# Patient Record
Sex: FEMALE | Race: WHITE
[De-identification: names, ages, dates, MRNs, and addresses within clinical notes are randomized per-mention and may not be internally consistent; named-entity substitution may affect disease eponyms.]

---

## 2020-11-06 LAB
ANION GAP SERPL CALC-SCNC: 6 MMOL/L (ref 5–19)
APPEARANCE UR: CLEAR
APTT PPP: YELLOW S
BARBITURATES UR QL SCN: NEGATIVE
BILIRUB UR QL STRIP: NEGATIVE
BUN SERPL-MCNC: 19 MG/DL (ref 7–20)
CALCIUM: 9.4 MG/DL (ref 8.4–10.2)
CHLORIDE SERPL-SCNC: 104 MMOL/L (ref 98–107)
CO2 SERPL-SCNC: 27 MMOL/L (ref 22–30)
ERYTHROCYTE [DISTWIDTH] IN BLOOD BY AUTOMATED COUNT: 14.1 % (ref 11.5–14)
ETHANOL SERPL-MCNC: < 10 MG/DL
GLUCOSE SERPL-MCNC: 87 MG/DL (ref 75–110)
GLUCOSE UR STRIP-MCNC: NEGATIVE MG/DL
HCT VFR BLD CALC: 37.6 % (ref 36–47)
HGB BLD-MCNC: 12.9 G/DL (ref 12–15.5)
KETONES UR STRIP-MCNC: NEGATIVE MG/DL
MCH RBC QN AUTO: 30.9 PG (ref 27–33.4)
MCHC RBC AUTO-ENTMCNC: 34.3 G/DL (ref 32–36)
MCV RBC AUTO: 90 FL (ref 80–97)
METHADONE UR QL SCN: NEGATIVE
NITRITE UR QL STRIP: NEGATIVE
PCP UR QL SCN: NEGATIVE
PH UR STRIP: 5 [PH] (ref 5–9)
PLATELET # BLD: 236 10^3/UL (ref 150–450)
POTASSIUM SERPL-SCNC: 4.5 MMOL/L (ref 3.6–5)
PROT UR STRIP-MCNC: NEGATIVE MG/DL
RBC # BLD AUTO: 4.17 10^6/UL (ref 3.72–5.28)
SP GR UR STRIP: 1.01
URINE AMPHETAMINES SCREEN: NEGATIVE
URINE BENZODIAZEPINES SCREEN: NEGATIVE
URINE COCAINE SCREEN: NEGATIVE
URINE MARIJUANA (THC) SCREEN: NEGATIVE
UROBILINOGEN UR-MCNC: NEGATIVE MG/DL (ref ?–2)
WBC # BLD AUTO: 4.5 10^3/UL (ref 4–10.5)

## 2020-11-06 NOTE — RADIOLOGY REPORT (SQ)
EXAM DESCRIPTION:  CHEST PA/LATERAL



IMAGES COMPLETED DATE/TIME:  11/6/2020 11:06 am



REASON FOR STUDY:  PRE-OP



COMPARISON:  None.



EXAM PARAMETERS:  NUMBER OF VIEWS: two views

TECHNIQUE: Digital Frontal and Lateral radiographic views of the chest acquired.

RADIATION DOSE: NA

LIMITATIONS: none



FINDINGS:  LUNGS AND PLEURA: No opacities, masses or pneumothorax. No pleural effusion.

MEDIASTINUM AND HILAR STRUCTURES: No masses or contour abnormalities.

HEART AND VASCULAR STRUCTURES: Heart normal size.  No evidence for failure.

BONES: No acute findings.

HARDWARE: None in the chest.

OTHER: No other significant finding.



IMPRESSION:  NO SIGNIFICANT RADIOGRAPHIC FINDING IN THE CHEST.



TECHNICAL DOCUMENTATION:  JOB ID:  [id number]

 2011 [employer]- All Rights Reserved



Reading location - IP/workstation name: [email or web address]

## 2020-11-06 NOTE — EKG REPORT
SEVERITY:- ABNORMAL ECG -

SINUS BRADYCARDIA

RBBB

:

Confirmed by: Evens Hill MD 06-Nov-2020 14:54:37

## 2020-11-11 ENCOUNTER — HOSPITAL ENCOUNTER (INPATIENT)
Dept: HOSPITAL 62 - INOR | Age: 65
LOS: 2 days | Discharge: HOME | DRG: 460 | End: 2020-11-13
Attending: ORTHOPAEDIC SURGERY | Admitting: ORTHOPAEDIC SURGERY
Payer: MEDICARE

## 2020-11-11 DIAGNOSIS — F41.1: ICD-10-CM

## 2020-11-11 DIAGNOSIS — M43.16: Primary | ICD-10-CM

## 2020-11-11 DIAGNOSIS — E03.9: ICD-10-CM

## 2020-11-11 DIAGNOSIS — Z86.010: ICD-10-CM

## 2020-11-11 DIAGNOSIS — Z23: ICD-10-CM

## 2020-11-11 DIAGNOSIS — E78.00: ICD-10-CM

## 2020-11-11 DIAGNOSIS — Z79.899: ICD-10-CM

## 2020-11-11 DIAGNOSIS — Z20.828: ICD-10-CM

## 2020-11-11 DIAGNOSIS — M51.16: ICD-10-CM

## 2020-11-11 DIAGNOSIS — Z86.14: ICD-10-CM

## 2020-11-11 PROCEDURE — 94799 UNLISTED PULMONARY SVC/PX: CPT

## 2020-11-11 PROCEDURE — 93010 ELECTROCARDIOGRAM REPORT: CPT

## 2020-11-11 PROCEDURE — 71046 X-RAY EXAM CHEST 2 VIEWS: CPT

## 2020-11-11 PROCEDURE — 0SB20ZZ EXCISION OF LUMBAR VERTEBRAL DISC, OPEN APPROACH: ICD-10-PCS | Performed by: ORTHOPAEDIC SURGERY

## 2020-11-11 PROCEDURE — 90686 IIV4 VACC NO PRSV 0.5 ML IM: CPT

## 2020-11-11 PROCEDURE — C9290 INJ, BUPIVACAINE LIPOSOME: HCPCS

## 2020-11-11 PROCEDURE — 86901 BLOOD TYPING SEROLOGIC RH(D): CPT

## 2020-11-11 PROCEDURE — 86850 RBC ANTIBODY SCREEN: CPT

## 2020-11-11 PROCEDURE — C9803 HOPD COVID-19 SPEC COLLECT: HCPCS

## 2020-11-11 PROCEDURE — C1781 MESH (IMPLANTABLE): HCPCS

## 2020-11-11 PROCEDURE — 80048 BASIC METABOLIC PNL TOTAL CA: CPT

## 2020-11-11 PROCEDURE — 86900 BLOOD TYPING SEROLOGIC ABO: CPT

## 2020-11-11 PROCEDURE — C1713 ANCHOR/SCREW BN/BN,TIS/BN: HCPCS

## 2020-11-11 PROCEDURE — 07DR0ZZ EXTRACTION OF ILIAC BONE MARROW, OPEN APPROACH: ICD-10-PCS | Performed by: ORTHOPAEDIC SURGERY

## 2020-11-11 PROCEDURE — 93005 ELECTROCARDIOGRAM TRACING: CPT

## 2020-11-11 PROCEDURE — 82962 GLUCOSE BLOOD TEST: CPT

## 2020-11-11 PROCEDURE — 87635 SARS-COV-2 COVID-19 AMP PRB: CPT

## 2020-11-11 PROCEDURE — 0SG007J FUSION OF LUMBAR VERTEBRAL JOINT WITH AUTOLOGOUS TISSUE SUBSTITUTE, POSTERIOR APPROACH, ANTERIOR COLUMN, OPEN APPROACH: ICD-10-PCS | Performed by: ORTHOPAEDIC SURGERY

## 2020-11-11 PROCEDURE — 00630 ANES PX LUMBAR REGION NOS: CPT

## 2020-11-11 PROCEDURE — 80307 DRUG TEST PRSMV CHEM ANLYZR: CPT

## 2020-11-11 PROCEDURE — 8E0W4CZ ROBOTIC ASSISTED PROCEDURE OF TRUNK REGION, PERCUTANEOUS ENDOSCOPIC APPROACH: ICD-10-PCS | Performed by: ORTHOPAEDIC SURGERY

## 2020-11-11 PROCEDURE — 87070 CULTURE OTHR SPECIMN AEROBIC: CPT

## 2020-11-11 PROCEDURE — 81001 URINALYSIS AUTO W/SCOPE: CPT

## 2020-11-11 PROCEDURE — 72100 X-RAY EXAM L-S SPINE 2/3 VWS: CPT

## 2020-11-11 PROCEDURE — 90471 IMMUNIZATION ADMIN: CPT

## 2020-11-11 PROCEDURE — 94760 N-INVAS EAR/PLS OXIMETRY 1: CPT

## 2020-11-11 PROCEDURE — 36415 COLL VENOUS BLD VENIPUNCTURE: CPT

## 2020-11-11 PROCEDURE — 83036 HEMOGLOBIN GLYCOSYLATED A1C: CPT

## 2020-11-11 PROCEDURE — G0008 ADMIN INFLUENZA VIRUS VAC: HCPCS

## 2020-11-11 PROCEDURE — 85027 COMPLETE CBC AUTOMATED: CPT

## 2020-11-11 RX ADMIN — DEXAMETHASONE SODIUM PHOSPHATE PRN MG: 10 INJECTION INTRAMUSCULAR; INTRAVENOUS at 20:12

## 2020-11-11 RX ADMIN — OXYCODONE HYDROCHLORIDE PRN MG: 5 TABLET ORAL at 16:17

## 2020-11-11 RX ADMIN — FENTANYL CITRATE PRN MCG: 50 INJECTION INTRAMUSCULAR; INTRAVENOUS at 10:10

## 2020-11-11 RX ADMIN — METHOCARBAMOL SCH MG: 750 TABLET ORAL at 21:58

## 2020-11-11 RX ADMIN — OXYCODONE HYDROCHLORIDE PRN MG: 5 TABLET ORAL at 11:35

## 2020-11-11 RX ADMIN — METFORMIN HYDROCHLORIDE SCH MG: 500 TABLET, FILM COATED ORAL at 17:28

## 2020-11-11 RX ADMIN — CLINDAMYCIN PHOSPHATE SCH MLS/HR: 18 INJECTION, SOLUTION INTRAVENOUS at 21:58

## 2020-11-11 RX ADMIN — GABAPENTIN SCH MG: 300 CAPSULE ORAL at 11:35

## 2020-11-11 RX ADMIN — CLINDAMYCIN PHOSPHATE SCH MLS/HR: 18 INJECTION, SOLUTION INTRAVENOUS at 13:40

## 2020-11-11 RX ADMIN — OXYCODONE HYDROCHLORIDE PRN MG: 5 TABLET ORAL at 20:54

## 2020-11-11 RX ADMIN — GABAPENTIN SCH MG: 300 CAPSULE ORAL at 17:28

## 2020-11-11 RX ADMIN — ATORVASTATIN CALCIUM SCH MG: 40 TABLET, FILM COATED ORAL at 21:58

## 2020-11-11 RX ADMIN — FENTANYL CITRATE PRN MCG: 50 INJECTION INTRAMUSCULAR; INTRAVENOUS at 10:05

## 2020-11-11 RX ADMIN — SENNOSIDES, DOCUSATE SODIUM SCH EACH: 50; 8.6 TABLET, FILM COATED ORAL at 17:28

## 2020-11-11 RX ADMIN — METHOCARBAMOL SCH MG: 750 TABLET ORAL at 13:39

## 2020-11-11 RX ADMIN — Medication SCH: at 13:58

## 2020-11-11 RX ADMIN — DEXAMETHASONE SODIUM PHOSPHATE PRN MG: 10 INJECTION INTRAMUSCULAR; INTRAVENOUS at 13:39

## 2020-11-11 RX ADMIN — GABAPENTIN SCH MG: 300 CAPSULE ORAL at 23:56

## 2020-11-11 RX ADMIN — Medication SCH ML: at 21:58

## 2020-11-11 RX ADMIN — ACETAMINOPHEN SCH MG: 325 TABLET ORAL at 13:38

## 2020-11-11 RX ADMIN — ACETAMINOPHEN SCH MG: 325 TABLET ORAL at 21:57

## 2020-11-11 NOTE — RADIOLOGY REPORT (SQ)
EXAM DESCRIPTION:  L SPINE 2 VIEWS; NO CHG FLUORO



IMAGES COMPLETED DATE/TIME:  11/11/2020 9:55 am



REASON FOR STUDY:  LUMBAR SPINE FUSION ASSISTED WITH FLUORO IN OR M54.5  LOW BACK PAIN M51.16  INTERV
ERTEBRAL DISC DISORDERS W RADICULOPATHY, LUMBAR M43.16  SPONDYLOLISTHESIS, LUMBAR REGION



COMPARISON:  None.



FLUOROSCOPY TIME:  0.9 minutes.

2 images saved to PACS.



TECHNIQUE:  Intra-operative images acquired during surgical procedure to evaluate progress.

NUMBER OF IMAGES: 2 images.



LIMITATIONS:  None.



FINDINGS:  Images of the lower lumbar spine acquired during the procedure.



IMPRESSION:  IMAGE(S) OBTAINED DURING PROCEDURE.



COMMENT:  Quality :  Final reports for procedures using fluoroscopy that document radiation exp
osure indices, or exposure time and number of fluorographic images (if radiation exposure indices are
 not available)

Please consult full operative report of the attending physician for description of the procedure.



TECHNICAL DOCUMENTATION:  JOB ID:  [id number]

 2011 [employer]- All Rights Reserved



Reading location - IP/workstation name: [email or web address]

## 2020-11-11 NOTE — OPERATIVE REPORT
Operative Report


DATE OF SURGERY: 11/11/20


PREOPERATIVE DIAGNOSIS: L4-5 spondylolisthesis.  L4-5 stenosis.  L4-5 degenerat

zahra disc disease.  Back pain.  Lumbar radiculitis


POSTOPERATIVE DIAGNOSIS: L4-5 spondylolisthesis.  L4-5 stenosis.  L4-5 

degenerative disc disease.  Back pain.  Lumbar radiculitis.  Status post L4-5 

anterior lateral lumbar interbody fusion with interbody spacer.  L4-5 posterior 

fusion robotically assisted with instrumentation and left iliac crest aspiration

through separate incision for bone marrow concentration to be used with 

allograft


OPERATION: L4-5 anterior lateral lumbar interbody fusion with interbody spacer. 

L4-5 posterior fusion robotically assisted with instrumentation and left iliac 

crest aspiration through separate incision for bone marrow concentration to be 

used with allograft


SURGEON: TAWNY TRUJILLO


1ST ASSISTANT: NESSA HAYDEN


ANESTHESIA: GA


COMPLICATIONS: 





None


ESTIMATED BLOOD LOSS: 100 cc


PROCEDURE: 





Patient is brought into the room placed under general anesthesia received 900 mg

of clindamycin within 1 hour of cut time was placed on the Jareth table medial 

epicondyles and axillary areas are well-padded.  Neuro monitoring leads for SSEP

and cranial motor testing are placed on the patient as well as a Hutchison catheter 

is placed preoperatively.  After appropriate surgical timeout the [employer] robot is utilized and on the right posterior superior iliac spine a 

pin is placed and attached to the robot.  After obtaining registration imaging 

in the AP and oblique position and verification 6.5 x 45 mm x2 and 6.5 x 40 mm x

2 screws are inserted using portal incisions on the right and the left at L4 and

L5 bilaterally.  Left iliac crest is aspirated at 2 different sites total of 50 

cc of bone marrow aspirate are obtained and concentrated to be used an interbody

spacer with the allograft.





Attention was then paid to the left sided anterior lateral portal for entry into

the disc space which is carried out under navigation guidance upon verification 

also with x-rays and then stimulation thresholds greater than 17 mA.  Upon 

inserting the portal into the disc space at L4-5 fluoroscopy was used to verify 

the position as it did also to verify the screws position.  Endplate geovanna and

curettes and brushes are used to carry out a complete discectomy then the verify

balloon is inserted to verify good contact with the endplates.  Then the 

appropriate size mesh spacer is introduced into the interbody.  The mesh spacer 

is soaked in bone marrow aspirate concentrate and is filled with bone graft 

showing good correction and good containment within the disc space at L4-5.  

Upon neuro monitoring testing and cranial motor testing found to be stable then 

the portal is removed anterior laterally and attention is then paid to the 

placement of the rods upon using the caliper device the appropriate length rods 

were determined to be 40 mm rods on the right and on the left those are passed 

down and locked into position and final tightened.  Then the tabs are removed 

and the portals are irrigated with copious amounts of irrigation and the 

posterior superior iliac spine pin connected to the robot is removed as well and

the deep and superficial tissues were infiltrated with 1.3% Exparel 20 cc mixed 

with 20 cc of 0.5% bupivacaine plain.  The portals are closed using 2-0 Vicryl 

and then Dermabond and Steri-Strips then 4 x 4's were used to cover the wounds 

on the right and the left and dressed with coverall tape.  Please note that this

procedure could not be done without the assistance of Akhil Bloom working to 

assist with the placement of the screws positioning of the robot carrying out 

the aspiration through the left side iliac crest aspiration please also note 

that the iliac crest aspiration is carried out on the left side through a 

separate incision.  Akhil Bloom was instrumental throughout this whole process 

and I could not have done this procedure without his assistance as mentioned 

above.

## 2020-11-11 NOTE — RADIOLOGY REPORT (SQ)
EXAM DESCRIPTION:  L SPINE 2 VIEWS; NO CHG FLUORO



IMAGES COMPLETED DATE/TIME:  11/11/2020 9:55 am



REASON FOR STUDY:  LUMBAR SPINE FUSION ASSISTED WITH FLUORO IN OR M54.5  LOW BACK PAIN M51.16  INTERV
ERTEBRAL DISC DISORDERS W RADICULOPATHY, LUMBAR M43.16  SPONDYLOLISTHESIS, LUMBAR REGION



COMPARISON:  None.



FLUOROSCOPY TIME:  0.9 minutes.

2 images saved to PACS.



TECHNIQUE:  Intra-operative images acquired during surgical procedure to evaluate progress.

NUMBER OF IMAGES: 2 images.



LIMITATIONS:  None.



FINDINGS:  Images of the lower lumbar spine acquired during the procedure.



IMPRESSION:  IMAGE(S) OBTAINED DURING PROCEDURE.



COMMENT:  Quality :  Final reports for procedures using fluoroscopy that document radiation exp
osure indices, or exposure time and number of fluorographic images (if radiation exposure indices are
 not available)

Please consult full operative report of the attending physician for description of the procedure.



TECHNICAL DOCUMENTATION:  JOB ID:  [id number]

 2011 [employer]- All Rights Reserved



Reading location - IP/workstation name: [email or web address]

## 2020-11-12 RX ADMIN — METHOCARBAMOL SCH MG: 750 TABLET ORAL at 14:10

## 2020-11-12 RX ADMIN — SENNOSIDES, DOCUSATE SODIUM SCH EACH: 50; 8.6 TABLET, FILM COATED ORAL at 09:44

## 2020-11-12 RX ADMIN — CETIRIZINE HYDROCHLORIDE SCH MG: 10 TABLET, FILM COATED ORAL at 09:44

## 2020-11-12 RX ADMIN — CLINDAMYCIN PHOSPHATE SCH MLS/HR: 18 INJECTION, SOLUTION INTRAVENOUS at 06:32

## 2020-11-12 RX ADMIN — OXYCODONE HYDROCHLORIDE PRN MG: 5 TABLET ORAL at 02:13

## 2020-11-12 RX ADMIN — POLYETHYLENE GLYCOL 3350 SCH: 17 POWDER, FOR SOLUTION ORAL at 09:45

## 2020-11-12 RX ADMIN — Medication SCH: at 07:26

## 2020-11-12 RX ADMIN — METHOCARBAMOL SCH MG: 750 TABLET ORAL at 21:57

## 2020-11-12 RX ADMIN — LEVOTHYROXINE SODIUM SCH MG: 88 TABLET ORAL at 06:32

## 2020-11-12 RX ADMIN — OXYCODONE HYDROCHLORIDE PRN MG: 5 TABLET ORAL at 21:20

## 2020-11-12 RX ADMIN — CLINDAMYCIN PHOSPHATE SCH MLS/HR: 18 INJECTION, SOLUTION INTRAVENOUS at 21:21

## 2020-11-12 RX ADMIN — SENNOSIDES, DOCUSATE SODIUM SCH EACH: 50; 8.6 TABLET, FILM COATED ORAL at 17:13

## 2020-11-12 RX ADMIN — Medication SCH: at 21:59

## 2020-11-12 RX ADMIN — ATORVASTATIN CALCIUM SCH MG: 40 TABLET, FILM COATED ORAL at 21:57

## 2020-11-12 RX ADMIN — Medication SCH: at 14:11

## 2020-11-12 RX ADMIN — GABAPENTIN SCH MG: 300 CAPSULE ORAL at 23:16

## 2020-11-12 RX ADMIN — GABAPENTIN SCH MG: 300 CAPSULE ORAL at 11:43

## 2020-11-12 RX ADMIN — METFORMIN HYDROCHLORIDE SCH: 500 TABLET, FILM COATED ORAL at 09:45

## 2020-11-12 RX ADMIN — METFORMIN HYDROCHLORIDE SCH: 500 TABLET, FILM COATED ORAL at 17:15

## 2020-11-12 RX ADMIN — ACETAMINOPHEN SCH MG: 325 TABLET ORAL at 21:57

## 2020-11-12 RX ADMIN — PANTOPRAZOLE SODIUM SCH MG: 20 TABLET, DELAYED RELEASE ORAL at 06:32

## 2020-11-12 RX ADMIN — OXYCODONE HYDROCHLORIDE PRN MG: 5 TABLET ORAL at 17:13

## 2020-11-12 RX ADMIN — ACETAMINOPHEN SCH MG: 325 TABLET ORAL at 06:30

## 2020-11-12 RX ADMIN — METHOCARBAMOL SCH MG: 750 TABLET ORAL at 06:32

## 2020-11-12 RX ADMIN — ACETAMINOPHEN SCH MG: 325 TABLET ORAL at 14:10

## 2020-11-12 RX ADMIN — GABAPENTIN SCH MG: 300 CAPSULE ORAL at 17:13

## 2020-11-12 RX ADMIN — GABAPENTIN SCH MG: 300 CAPSULE ORAL at 06:32

## 2020-11-12 RX ADMIN — CLINDAMYCIN PHOSPHATE SCH MLS/HR: 18 INJECTION, SOLUTION INTRAVENOUS at 14:11

## 2020-11-12 RX ADMIN — OXYCODONE HYDROCHLORIDE PRN MG: 5 TABLET ORAL at 08:42

## 2020-11-12 NOTE — XMS REPORT
Patient Summary Document

                          Created on:2020



Patient:AMANDA CHAMORRO

Sex:Female

:1955

External Reference #:[id number]





Demographics







                          Address                   [address] [place] [id number]

 

                          Home Phone                [phone]

 

                          Work Phone                [phone]

 

                          Mobile Phone              [phone]

 

                          Email Address             [email or web address]

 

                          Preferred Language        English

 

                          Marital Status            Unknown

 

                          Buddhist Affiliation     Unknown

 

                          Race                      Unknown

 

                          Additional Race(s)        White



                                                    Unavailable



                                                    [id number]



                                                    Unavailable

 

                          Ethnic Group              Unknown









Author







                          Organization              [facility]

 

                          Address                   [facility] [place] [address] [place] [id number]

 

                          Phone                     [phone]









Care Team Providers







                    Name                Role                Phone

 

                    JOHANNY PARNELL      Primary Care Physician Unavailable

 

                    Johanny Parnell    Attending Clinician Unavailable

 

                    NYDIA               Attending Clinician Unavailable

 

                    GABBIE PARNELL       Attending Clinician Unavailable

 

                    EPDRO PABLO              Attending Clinician Unavailable

 

                    NICOLLE MADRIGAL Attending Clinician Unavailable

 

                    NERI     Attending Clinician Unavailable

 

                    PRINCE                 Attending Clinician Unavailable

 

                    ILDEFONSO DALTON          Attending Clinician Unavailable

 

                    GABBIE NUNEZ      Attending Clinician Unavailable

 

                    ESTEVAN MORELAND            Attending Clinician Unavailable

 

                    NICOLLE MADRIGAL Admitting Clinician Unavailable

 

                    NERI     Admitting Clinician Unavailable









Allergies, Adverse Reactions, Alerts







        Allergy Allergy Status  Severity Reaction(s) Onset   Inactive Treating C

omments



        Name    Type                            Date    Date    Clinician 

 

        Penicillins Drug    Inactive High    Hives   -                  



                Allergy                         7-16                    



                                                00:00:                  



                                                00                      

 

        Penicillins Propensity Active  High    Hives                     



                to adverse                         7-16                    



                reactions                         00:00:                  



                                                00                      

 

        penicillin drug    Active          Itching 2009-0                  



        G       allergy                         9-04                    



                                                00:00:                  



                                                00                      

 

        Penicillins Allergy to Active          Hives                           



                substance                                                 

 

        Penicillins Allergy to Active  Moderate HIVES                           



        ([id number] Substance                                                 



        )                                                               







Medications







       Ordered Filled Start  Stop   Current Ordering Indication Dosage Frequency

 Signature

                          Comments                  Components



      Medication Medication Date  Date  Medication? Clinician                   

(SIG)       



      Name  Name                                                        

 

      metformin              Yes                     Q1D   take 1 by      

 



       mg       5-12                                      Oral route      

 



      tablet,exte       00:00:                                     every day    

   



      nded        00                                                    



      release 24                                                             



      hr                                                                

 

      Glucometer              Yes                           Check FSBS    

   



      with Strips       5-12                                      once        



      & Lancets       00:00:                                     daily. Dx      

 



                  00                                        E11.8       



                                                            Dispense       



                                                            #1          



                                                            glucometer       



                                                            , #50 test       



                                                            strips, #1       



                                                            box         



                                                            lancets       

 

      LEVOTHYROXI       2019       Yes                           TAKE 1       



      NE 0.075MG       2-18                                      TABLET BY      

 



      (75MCG)       00:00:                                     MOUTH       



      TABS        00                                        EVERY DAY       

 

      ATORVASTATI       2019       Yes                           TAKE 1       



      N 20 MG       1-22                                      TABLET BY       



      TABLET       00:00:                                     MOUTH       



                  00                                        EVERY DAY       

 

      Synthroid              Yes               1{table Q1D   take 1       



      88 mcg       9-17                          t}          tablet by       



      tablet       00:00:                                     oral route       



                  00                                        every day       

 

      Nexium 40              Yes                           TAKE ONE       



      mg          9-03                                      CAPSULE BY       



      capsule,del       00:00:                                     MOUTH       



      ayed        00                                        EVERY DAY       



      release                                                 FOR REFLUX       

 

      esomeprazol              No                            esomeprazo   

    



      e magnesium       4-17                                      le          



      40 mg       00:00:                                     magnesium       



      capsule,del       00                                        40 mg       



      ayed                                                  capsule,de       



      release                                                 layed       



                                                            release       

 

      cyclobenzap       2018       No          Levator 5MG         Take 1 Take

 1 



      rine        2-04                    spasm             tablet (5 tablet (5 



      (FLEXERIL)       00:00:                                     mg total) mg t

otal) 



      5 MG tablet       00                                        by mouth by mo

uth 



                                                            nightly as nightly 



                                                            needed for as needed

 



                                                            muscle for   



                                                            spasms. muscle 



                                                                  spasms. 

 

      atorvastati       2018       Yes               20MG        Take 20 mg Ta

ke 20 



      n (LIPITOR)       0-19                                      by mouth mg by

 



      20 MG       11:15:                                     daily. mouth 



      tablet       21                                              daily. 

 

      levothyroxi       2018       Yes               88ug        Take by Take 

by 



      ne 88 mcg       0-19                                      mouth. mouth. 



      cap         11:15:                                                 



                  21                                                    

 

      esomeprazol       2018       Yes               40MG        Take 40 mg Ta

ke 40 



      e (NEXIUM)       0-19                                      by mouth mg by 



      40 MG       11:15:                                     every mouth 



      capsule       21                                        morning every 



                                                            before morning 



                                                            breakfast. before 



                                                                  breakfast 



                                                                  .     

 

      gabapentin       2018       Yes               300MG       Take 300 Take 

300 



      (NEURONTIN)       0-19                                      mg by mg by 



      300 MG       11:15:                                     mouth Four mouth 



      capsule       21                                        (4) times Four (4)

 



                                                            a day. times a 



                                                                  day.  

 

      gabapentin       2018       No                600MG       Take 600 Take 

600 



      (NEURONTIN)       0-19                                      mg by mg by 



      300 MG       11:15:                                     mouth mouth 



      capsule       21                                        nightly. nightly. 

 

      ibuprofen       2018       No                600MG       Take 1 Take 1 



      (ADVIL,MOTR       0-19                                      tablet tablet 



      IN) 600 MG       00:00:                                     (600 mg (600 m

g 



      tablet       00                                        total) by total) by

 



                                                            mouth mouth 



                                                            every six every six 



                                                            (6) hours (6) hours 



                                                            as needed as needed 



                                                            for pain. for pain. 

 

      acetaminoph       2018 2018- No                650MG       Take 2 Take 2

 



      en          0-19  10-19                               tablets tablets 



      (TYLENOL)       00:00: 00:00                               (650 mg (650 mg

 



      325 MG       00    :00                                 total) by total) by

 



      tablet                                                 mouth mouth 



                                                            every six every six 



                                                            (6) hours (6) hours 



                                                            as needed as needed 



                                                            for pain. for pain. 

 

      polyethylen       2018- No                17g         Take 17 g Rochelle

e 17 g 



      e glycol       0-19  10-19                               by mouth by mouth

 



      (MIRALAX)       00:00: 00:00                               daily. daily. 



      17 gram       00    :00                                             



      packet                                                             

 

      oxyCODONE       2018- No                5MG         Take 1 Take 1 



      (ROXICODONE       0-19  10-19                               tablet (5 tabl

et (5 



      ) 5 MG       00:00: 00:00                               mg total) mg total

) 



      immediate       00    :00                                 by mouth by mout

h 



      release                                                 every six every si

x 



      tablet                                                 (6) hours (6) hours

 



                                                            as needed as needed 



                                                            for pain. for pain. 

 

      oxyCODONE       2018- No                5MG         Take 1 Take 1 



      (ROXICODONE       0-19  10-19                               tablet (5 tabl

et (5 



      ) 5 MG       00:00: 00:00                               mg total) mg total

) 



      immediate       00    :00                                 by mouth by mout

h 



      release                                                 every six every si

x 



      tablet                                                 (6) hours (6) hours

 



                                                            as needed as needed 



                                                            for pain. for pain. 



                                                            For   For   



                                                            postoperat postopera

 



                                                            zahra pain tive pain 

 

      diclofenac              Yes               75MG        Take 75 mg Rochelle

e 75 



      (VOLTAREN)       7-16                                      by mouth mg by 



      75 MG EC       10:28:                                     Two (2) mouth Tw

o 



      tablet       44                                        times a (2) times 



                                                            day.  a day. 

 

      sitaGLIPtin       -       Yes               1{tbl}       Take 1 Take 

1 



      -metFORMIN       7-16                                      tablet by table

t by 



      (JANUMET)       10:28:                                     mouth 2 mouth 2

 



      50-1,000 mg       43                                        (two) (two) 



      per tablet                                                 times a times a

 



                                                            day with day with 



                                                            meals. meals. 

 

      diclofenac       -       Yes               1{table Q12H  take 1      

 



      sodium 75       4-30                          t}          tablet       



      mg          00:00:                                     (75MG) by       



      tablet,laci       00                                        oral route    

   



      yed release                                                 2 times       



                                                            every day       



                                                            as needed       

 

      Ibuprofen       -       Yes   Jazlyn       800         Every 8       



      (Motrin       1-17              David-Al                   Hours as     

  



      Tab*) 800       00:00:             delphine                   needed for     

  



      Mg Tab       00                                        Pain        

 

      Oxycodone              Yes   Jazlyn       5           Every 4       



      Hcl         1-17              David-Al                   Hours as       



      (Roxicodone       00:00:             delphine                   needed for   

    



      *) 5 Mg       00                                        Pain        



      Tablet                                                             

 

      gabapentin              Yes                           take 1       



      300 mg       6-23                                      capsule by       



      capsule       00:00:                                     oral route       



                  00                                        three       



                                                            times a       



                                                            day         

 

      Sucralfate       2013 2017- No    Dereje WELCH       1           Twice Per   

    



      (Carafate       -12         Popeye Lucas                   Day        

 



      Susp*) 1       00:00: 00:00                                           



      Gm/10 Ml       00    :00                                             



      Oral.susp,                                                             



      1 Gm Oral                                                             

 

      atorvastati                   No                            atorvastat    

   



      n 20 mg                                                 in 20 mg       



      tablet TK 1                                                 tablet TK     

  



      T PO QD FOR                                                 1 T PO QD     

  



      CHOLESTRERO                                                 FOR         



      L                                                     CHOLESTRER       



                                                            OL          

 

      diclofenac                   No                            diclofenac     

  



      sodium 75                                                 sodium 75       



      mg                                                    mg          



      tablet,laci                                                 tablet,del    

   



      yed release                                                 ayed        



      TAKE 1                                                 release       



      TABLET BY                                                 TAKE 1       



      MOUTH TWICE                                                 TABLET BY     

  



      DAILY                                                 MOUTH       



                                                            TWICE       



                                                            DAILY       

 

      docusate                   No                            docusate       



      sodium 100                                                 sodium 100     

  



      mg capsule                                                 mg capsule     

  



      TAKE 1                                                 TAKE 1       



      CAPSULE BY                                                 CAPSULE BY     

  



      MOUTH TWICE                                                 MOUTH       



      A DAY                                                 TWICE A       



                                                            DAY         

 

      esomeprazol                   No                            esomeprazo    

   



      e magnesium                                                 le          



      40 mg                                                 magnesium       



      capsule,del                                                 40 mg       



      ayed                                                  capsule,de       



      release TK                                                 layed       



      1 C PO QD                                                 release TK      

 



      FOR REFULX                                                 1 C PO QD      

 



                                                            FOR REFULX       

 

      gabapentin                   No                            gabapentin     

  



      300 mg                                                 300 mg       



      capsule                                                 capsule       



      TAKE ONE                                                 TAKE ONE       



      CAPSULE BY                                                 CAPSULE BY     

  



      MOUTH TWICE                                                 MOUTH       



      DAILY AND 2                                                 TWICE       



      CAPSULES AT                                                 DAILY AND     

  



      BEDTIME                                                 2 CAPSULES       



                                                            AT BEDTIME       

 

      ibuprofen                   No                            ibuprofen       



      800 mg                                                 800 mg       



      tablet                                                 tablet       

 

      Janumet XR                   No                            Janumet XR     

  



      50 mg-1,000                                                 50          



      mg                                                    mg-1,000       



      tablet,exte                                                 mg          



      nded                                                  tablet,ext       



      release                                                 ended       



      TAKE 1                                                 release       



      TABLET BY                                                 TAKE 1       



      MOUTH TWICE                                                 TABLET BY     

  



      A DAY                                                 MOUTH       



                                                            TWICE A       



                                                            DAY         

 

      Kombiglyze                   No                            Kombiglyze     

  



      XR 2.5                                                 XR 2.5       



      mg-1,000 mg                                                 mg-1,000      

 



      tablet,exte                                                 mg          



      nded                                                  tablet,ext       



      release                                                 ended       



                                                            release       

 

      levothyroxi                   No                            levothyrox    

   



      ne 75 mcg                                                 ine 75 mcg      

 



      tablet TK 1                                                 tablet TK     

  



      T PO QD                                                 1 T PO QD       

 

      levothyroxi                   No                            levothyrox    

   



      ne 88 mcg                                                 ine 88 mcg      

 



      tablet TK 1                                                 tablet TK     

  



      T PO QD                                                 1 T PO QD       

 

      mupirocin 2                   No                            mupirocin     

  



      % topical                                                 2 %         



      ointment                                                 topical       



                                                            ointment       

 

      nitrofurant                   No                            nitrofuran    

   



      oin                                                   toin        



      monohydrate                                                 monohydrat    

   



      /macrocryst                                                 e/macrocry    

   



      als 100 mg                                                 stals 100      

 



      capsule                                                 mg capsule       

 

      Proctosol                   No                            Proctosol       



      HC 2.5 %                                                 HC 2.5 %       



      rectal                                                 rectal       



      cream with                                                 cream with     

  



      applicator                                                 applicator     

  



      APPLY TO                                                 APPLY TO       



      AFFECTED                                                 AFFECTED       



      AREA TWICE                                                 AREA TWICE     

  



      A DAY FOR                                                 A DAY FOR       



      HEMORRHOIDS                                                 HEMORRHOID    

   



                                                            S           

 

      sulfamethox                   No                            sulfametho    

   



      azole 800                                                 xazole 800      

 



      mg-trimetho                                                 mg-trimeth    

   



      prim 160 mg                                                 oprim 160     

  



      tablet TK 1                                                 mg tablet     

  



      T PO Q 12 H                                                 TK 1 T PO     

  



      FOR 10 DAYS                                                 Q 12 H FOR    

   



                                                            10 DAYS       

 

      Trimo-Mata                   No                            Trimo-Mata       



      Jelly 0.025                                                 Jelly       



      %-0.01 %                                                 0.025       



      vaginal                                                 %-0.01 %       



      INSERT 1 G                                                 vaginal       



      TWICE A                                                 INSERT 1 G       



      WEEK BY                                                 TWICE A       



      VAGINAL                                                 WEEK BY       



      ROUTE AT                                                 VAGINAL       



      BEDTIME.                                                 ROUTE AT       



                                                            BEDTIME.       

 

      valacyclovi                   No                            valacyclov    

   



      r 1 gram                                                 ir 1 gram       



      tablet TAKE                                                 tablet       



      1 TABLET BY                                                 TAKE 1       



      MOUTH EVERY                                                 TABLET BY     

  



      8 HOURS                                                 MOUTH       



                                                            EVERY 8       



                                                            HOURS       

 

      Bactrim DS                   No                1     Q12H  Bactrim DS     

  



      800 mg-160                                                 800 mg-160     

  



      mg tablet                                                 mg tablet       



      Take 1                                                 Take 1       



      tablet                                                 tablet       



      every 12                                                 every 12       



      hours by                                                 hours by       



      oral route                                                 oral route     

  



      for 10                                                 for 10       



      days.                                                 days.       

 

      metformin                   No                            metformin       



       mg                                                  mg       



      tablet,exte                                                 tablet,ext    

   



      nded                                                  ended       



      release 24                                                 release 24     

  



      hr 1 tab po                                                 hr 1 tab      

 



      daily                                                 po daily       

 

      tramadol 50                   No                            tramadol      

 



      mg tablet 1                                                 50 mg       



      tab po                                                 tablet 1       



      daily                                                 tab po       



                                                            daily       

 

      Atorvastati                   Yes               20          Once Per      

 



      n Calcium                                                 Day         



      (Lipitor*)                                                             



      20 Mg                                                             



      Tablet                                                             

 

      Esomeprazol                   Yes               40          Daily       



      e Magnesium                                                 Before       



      (Nexium*)                                                 Breakfast       



      40 Mg Capcr                                                             

 

      Gabapentin                   Yes               300         Twice Per      

 



      (Neurontin*                                                 Day         



      ) 300 Mg                                                             



      Cap                                                               

 

      Gabapentin                   Yes               600         At Bedtime     

  



      (Neurontin*                                                             



      ) 600 Mg                                                             



      Tablet                                                             

 

      Esomeprazol             2018- No                40          Once Per      

 



      e Magnesium                                            Day         



      (Nexium*)             00:00                                           



      40 Mg             :00                                             



      Capcr, 40                                                             



      Mg Oral                                                             

 

      Aspirin             2017- No                81          Once Per       



      (Ecotrin*)                                            Day         



      81 Mg             00:00                                           



      Tabec, 81             :00                                             



      Mg Oral                                                             

 

      Esomeprazol             2017- No                40          Daily       



      e Magnesium                                            Before       



      (Nexium*)             00:00                               Breakfast       



      40 Mg             :00                                             



      Capcr, 40                                                             



      Mg Oral                                                             

 

      Ibuprofen             2017- No                200         As          



      (Advil) 200                                            Directed      

 



      Mg Cap, 200             00:00                                           



      Mg Oral             :00                                             

 

      Levothyroxi             - No                75          Once Per      

 



      ne Sodium                                            Day         



      (Synthroid*             00:00                                           



      ) 75 Mcg             :00                                             



      Tablet, 75                                                             



      Mcg Oral                                                             

 

      Metformin              No                750         Daily With      

 



      Hcl                                              Evening       



      (Glucophage             00:00                               Meal        



      *) 500 Mg             :00                                             



      Tab, 750 Mg                                                             



      Oral                                                              

 

      Nitroglycer             - No                .4          As Needed     

  



      in                                                           



      (Nitrostat*             00:00                                           



      ) 0.4 Mg             :00                                             



      Tab.subl,                                                             



      0.4 Mg                                                             



      Sublingual                                                             

 

      Atorvastati             - No                                        



      n Calcium                                                        



      (Lipitor             00:00                                           



      (Unknown             :00                                             



      Dosage))                                                             



      Tab,                                                              

 

      Esomeprazol             - No                40          Once Per      

 



      e Magnesium                                            Day         



      (Nexium) 40             00:00                                           



      Mg Capcr,             :00                                             



      40 Mg Oral                                                             

 

      Levothyroxi             - No                75          Once Per      

 



      ne Sodium                                            Day         



      (Synthroid*             00:00                                           



      ) 88 Mcg             :00                                             



      Tablet, 75                                                             



      Mcg Oral                                                             

 

      Nitroglycer             - No                .1                      



      in                                                           



      (Nitroglyce             00:00                                           



      rin Patch)             :00                                             



      1 Each                                                             



      Patch.td24,                                                             



      0.1 Mg                                                             



      Transderm                                                             

 

      Salmeterol             - No                                        



      Xinafoate/F                                                        



      luticasone             00:00                                           



      (Advair             :00                                             



      Diskus                                                             



      (Unknown                                                             



      Dosage)) 1                                                             



      Ea Puff,                                                             







Problems







        Condition Condition Condition Status  Onset   Resolution Last    Treatin

g Comments



        Name    Details Category         Date    Date    Treatment Clinician 



                                                        Date            

 

        Lumbar  Lumbar  Problem Active                            



        spondylolis Spondylolis                 8-11                            



        thesis  thesis                  00:00:                          



                                        00                              

 

        Postmenopau Postmenopau Problem Active                            



        tracey     tracey                     6-09                            



        osteoporosi Osteoporosi                 00:00:                          



        s       s                       00                              

 

        Varicose Varicose Problem Active                            



        veins of Veins of                 4-17                            



        lower   Lower                   00:00:                          



        extremity Extremity                 00                              

 

        Prolapse of Prolapse of Condition Active  2018    

     Overview:



        anterior anterior                 905            15:37:07         Added



        vaginal vaginal                 00:00:                          automati

c



        wall    wall                    00                              ambery javi

m



                                                                        request



                                                                        for



                                                                        surgery



                                                                        [id number]

 

        Vaginal Vaginal Problem Active  2018         



        vault   vault                   7-16            11:54:18         



        prolapse prolapse                 00:00:                          



                                        00                              

 

        Blood   Blood   Problem Active  2018         



        chemistry chemistry                 4-30            00:00:00         



        abnormal abnormal                 00:00:                          



                                        00                              

 

                Status post Problem Active                            



                laparoscopi                 1-16                            



                c assisted                 13:11:                          



                vaginal                 00                              



                hysterectom                                                 



                y                                                       

 

                Cystocele Problem Active                            



                with                    1-16                            



                rectocele                 13:00:                          



                                        00                              

 

                Prolapse of Problem Active                            



                female                  1-16                            



                pelvic                  12:59:                          



                organs                  00                              

 

                Uterine Problem Active                            



                prolapse                 1-16                            



                                        12:59:                          



                                        00                              

 

        Bacterial Bacterial Problem Active  2017         



        urinary urinary                 2-            00:00:00         



        infection infection                 00:00:                          



                                        00                              

 

        Abnormal Abnormal Problem Active  2017         



        finding on finding on                             00:00:00         



        evaluation evaluation                 00:00:                          



        procedure procedure                 00                              

 

        Acquired Acquired Problem Active  2017         



        hypothyroid hypothyroid                             00:00:00        

 



        ism     ism                     00:00:                          



                                        00                              

 

        Dyslipidemi Dyslipidemi Problem Active  2017      

   



        a       a                                   00:00:00         



                                        00:00:                          



                                        00                              

 

        Uterine Uterine Problem Active  2017         



        prolapse prolapse                 1-            00:00:00         



                                        00:00:                          



                                        00                              

 

                Sigmoid Problem Active                            



                diverticulo                 8-25                            



                sis                     09:19:                          



                                        00                              

 

                Personal Problem Active                            



                history of                 8-25                            



                colonic                 08:16:                          



                polyps                  00                              

 

        Osteopenia Osteopenia Problem Active  2017-0          2017-06-15        

 



                                        6-15            00:00:00         



                                        00:00:                          



                                        00                              

 

        Abnormal Abnormal Problem Active  2017-0          2017-06-15         



        cervical cervical                 6-15            00:00:00         



        Papanicolao Papanicolao                 00:00:                          



        u smear u smear                 00                              

 

        Costal  Costal  Problem Active  2016         



        chondritis chondritis                             00:00:00         



                                        00:00:                          



                                        00                              

 

        Metatarsalg Metatarsalg Problem Active  2016-1          2016-10-14      

   



        ia      ia                      0-14            00:00:00         



                                        00:00:                          



                                        00                              

 

        Gastroesoph Gastroesoph Problem Active  2016      

   



        ageal   ageal                               00:00:00         



        reflux  reflux                  00:00:                          



        disease disease                 00                              



        without without                                                 



        esophagitis esophagitis                                                 

 

        Impaired Impaired Problem Active  2015         



        fasting fasting                             00:00:00         



        glycaemia glycaemia                 00:00:                          



                                        00                              

 

        Cellulitis Cellulitis Problem Active  2015-1          2015-12-10        

 



        of buttock of buttock                 2-10            00:00:00         



                                        00:00:                          



                                        00                              

 

        Body mass Body mass Problem Active  2015-1          2015-12-10         



        index 25-29 index 25-29                 2-10            00:00:00        

 



        -       -                       00:00:                          



        overweight overweight                 00                              

 

        Cellulitis Cellulitis Problem Active  2015-1          2015-12-10        

 



        of groin of groin                 2-10            00:00:00         



                                        00:00:                          



                                        00                              

 

        Hypothyroid Hypothyroid Problem Active                                  



        ism     ism                                                     

 

        Hypercholes Hypercholes Problem Active                                  



        terolemia terolemia                                                 

 

        Generalized Generalized Problem Active                                  



        anxiety Anxiety                                                 



        disorder Disorder                                                 

 

        Allergic Allergic Problem Active                                  



        rhinitis Rhinitis                                                 

 

        Gastroesoph Gastroesoph Problem Active                                  



        ageal   ageal                                                   



        reflux  Reflux                                                  



        disease Disease                                                 

 

        Diverticuli Diverticuli Problem Active                                  



        tis     tis                                                     

 

        Lumbar disc Lumbar Disc Problem Active                                  



        prolapse Prolapse                                                 



        with    with                                                    



        radiculopat Radiculopat                                                 



        hy      hy                                                      

 

        Low back Low Back Problem Active                                  



        pain    Pain                                                    

 

        Acquired Acquired Problem Active                                  



        spondylolis Spondylolis                                                 



        thesis  thesis                                                  

 

        Impaired Impaired Problem Active                                  



        glucose Glucose                                                 



        tolerance Tolerance                                                 

 

        Methicillin Methicillin Problem Active                                  



        resistant Resistant                                                 



        staphylococ Staphylococ                                                 



        cus aureus cus Aureus                                                 



        carrier Carrier                                                 

 

        History of History of Problem Active                                  



        polyp of Polyp of                                                 



        colon   Colon                                                   

 

        Hyperlipide Hyperlipide Problem Active                                  



        damaso     damaso                                                     

 

        Degeneratio Degeneratio Problem Active                                  



        n of    n of                                                    



        lumbosacral Lumbosacral                                                 



        interverteb Interverteb                                                 



        ral disc ral Disc                                                 

 

        Lumbosacral Lumbosacral Problem Active                                  



        neuritis Neuritis                                                 







Procedures







                Procedure       Date / Time Performed Performing Clinician Devic

e

 

                XR, lumbar spine 2020 00:00:00                 

 

                Office/outpatient visit, est, 2020 00:00:00 Johanny Parnell 



                detailed                                        

 

                [facility] MGMT SRVC 20 MIN 2020 00:00:00 Johanny Parnell

 

 

                bone density    2020 00:00:00                 

 

                Hemoglobin A1C  2019 00:00:00 Johanny Parnell 

 

                Draw Blood      2019 00:00:00 Johanny Parnell 

 

                Office/outpatient visit, est, 2019 00:00:00 Johanny Parnell 



                detailed                                        

 

                ADMIN PNEUMOCOCCAL VACCINE 2019 00:00:00 Johanny Parnell 

 

                PNEUMOCOCCAL VACC, 13 FRED IM 2019 00:00:00 Johanny Parnell 

 

                ADMIN INFLUENZA VIRUS VAC 2019 00:00:00 Johanny Parnell 

 

                IIV ADJUVANT VACCINE IM 2019 00:00:00 Johanny Parnell 

 

                Comp asses care plan [facility] 2019 00:00:00 Johanny Parnell 



                svc_provider                                    

 

                Initial preventive exam 2019 00:00:00 Johanny Parnell 

 

                Office/outpatient visit, est, 2019 00:00:00 Adriana Parnellna

 ESTEVAN 



                detailed                                        

 

                OFFICE/OUTPATIENT VISIT, EST 2018 00:00:00 Johanny Parnell 

 

                Office/outpatient visit, est, 2018 00:00:00 Johanny Parnell 



                detailed                                        

 

                Immunization admin, 1 vaccine 2018 00:00:00 Adriana Parnellna

 ESTEVAN 

 

                Influenza Virus Vaxccine, 2018 00:00:00 Johanny Parnell 



                Quadrivalen (ccIIV4)                                 

 

                POCT GLUCOSE, INTERFACED 2018-10-25 16:55:00 Bobby Madrigal         

 

                POCT GLUCOSE, INTERFACED 2018-10-25 13:01:00 Bobby Madrigal         

 

                Office/outpatient visit, est, 2018 00:00:00 Johanny Parnell 



                detailed                                        

 

                Total Hysterectomy 2018 00:00:00                 

 

                Laparoscopic assisted vaginal 2018 00:00:00 JAZLNY SCHAFFER 



                hysterectomy                                    

 

                Anterior and posterior 2018 00:00:00 KYM HE 



                colporrhaphy                                    

 

                Cystoscopy      2018 00:00:00 JAZLYN HE 

 

                URINALYSIS NONAUTO W/O SCOPE 2017 00:00:00 Johanny Parnell 

 

                Preventive checkup, est, 2017 00:00:00 Tiarra Dennismarkus BARRETO 



                yrs                                             

 

                Office/outpatient visit, est, 2017 00:00:00 Tiarra Johannymary BARRETO 



                detailed                                        

 

                Office/outpatient visit, est, 2017-06-15 00:00:00               

  



                detailed                                        

 

                OFFICE/OUTPATIENT VISIT, EST 2016 00:00:00                

 

 

                OFFICE/OUTPATIENT VISIT, EST 2016 00:00:00                

 

 

                URINALYSIS NONAUTO W/O SCOPE 2016 00:00:00                

 

 

                Preventive checkup, est, 4064 2016 00:00:00              

   



                yrs                                             

 

                OFFICE/OUTPATIENT VISIT, EST 2016-10-14 00:00:00                

 

 

                Office/outpatient visit, est, 2016 00:00:00               

  



                detailed                                        

 

                URINALYSIS NONAUTO W/O SCOPE 2015 00:00:00 Julio C Cabrera 

 

 

                Blood, occult, qual, feces, 1-3 2015 00:00:00 Julio C Cabrera  



                simultn detrm                                   

 

                Preventive checkup, est, 4064 2015 00:00:00              

   



                yrs                                             

 

                Office/outpatient visit, est, 2015-12-10 00:00:00               

  



                detailed                                        

 

                No Charge       2015 00:00:00                 

 

                OFFICE/OUTPATIENT VISIT, EST 2015 00:00:00 Julio C Cabrera 

 

 

                Incision & drainage abscess, 2015 00:00:00 Julio C Cabrera 

 



                compl/multiple                                  

 

                OFFICE/OUTPATIENT VISIT, EST 2015 00:00:00                

 

 

                Incision & drainage abscess, 2015 00:00:00                

 



                compl/multiple                                  

 

                OFFICE/OUTPATIENT VISIT, EST 2015 00:00:00                

 

 

                Preventive checkup, est, 4064 2014-12-15 00:00:00              

   



                yrs                                             

 

                Urinalysis, non-automated, 2014-12-15 00:00:00                 



                w/scope                                         

 

                Draw Blood      2014 00:00:00                 

 

                OFFICE/OUTPATIENT VISIT, EST 2014 00:00:00                

 

 

                OFFICE/OUTPATIENT VISIT, EST 2014 00:00:00                

 

 

                Incision & drainage abscess, 2014 00:00:00                

 



                simple/single                                   

 

                OFFICE/OUTPATIENT VISIT, EST 2014-10-14 00:00:00                

 

 

                OFFICE/OUTPATIENT VISIT, EST 2014 00:00:00                

 

 

                Urinalysis, non-automated, 2014 00:00:00                 



                w/scope                                         

 

                OFFICE/OUTPATIENT VISIT, EST 2014 00:00:00                

 

 

                Solumedrol      2014 00:00:00                 

 

                OFFICE/OUTPATIENT VISIT, EST 2014 00:00:00                

 

 

                Toradol         2014 00:00:00                 

 

                OFFICE/OUTPATIENT VISIT, EST 2014-06-10 00:00:00                

 

 

                Draw Blood      2014 00:00:00 Julio C Cabrera  

 

                TSH, thyroid stimulating hormone 2013 00:00:00 Chema Cabrera  

 

                Assay, free thyroxine 2013 00:00:00 Julio C Cabrera  

 

                Lipid panel     2013 00:00:00 Julio C Cabrera  

 

                Hepatitis function panel 2013 00:00:00 Julio C Cabrera  

 

                Assay, GGT enzyme 2013 00:00:00 Julio C Cabrera  

 

                Cholesterol, serum/whole blood, 2013 00:00:00 Julio C Cabrera  



                total                                           

 

                LDH enzyme      2013 00:00:00 Julio C Cabrera  

 

                Glucose         2013 00:00:00 Julio C Cabrera  

 

                Hemoglobin A1C  2013 00:00:00 Julio C Cabrera  

 

                OFFICE/OUTPATIENT VISIT, EST 2013-10-04 00:00:00                

 

 

                General health panel 2013-10-01 00:00:00 Julio C Cabrera  

 

                Hemoglobin A1C  2013-10-01 00:00:00 Julio C Cabrera  

 

                Glucose         2013-10-01 00:00:00 Julio C Cabrera  

 

                Preventive checkup, est, 4064 2013 00:00:00              

   



                yrs                                             

 

                Pap Smear       2013 00:00:00                 

 

                Urinalysis, non-automated, 2013 00:00:00                 



                w/scope                                         

 

                Draw Blood      2013 00:00:00 RicardoJulio C blackwood  

 

                Office/outpatient visit, est, 2012-10-12 00:00:00               

  



                exp prob                                        

 

                Office/outpatient visit, est, 2012-10-05 00:00:00               

  



                detailed                                        

 

                METHYLPREDNISOLONE 40 MG INJ 2012-10-05 00:00:00                

 

 

                Arthrocentesis major joint/bursa 2012-10-05 00:00:00            

     

 

                Draw Blood      2012-10-02 00:00:00 Julio C Cabrera  

 

                Office/outpatient visit, est, 2012 00:00:00               

  



                exp prob                                        

 

                METHYLPREDNISOLONE 40 MG INJ 2012 00:00:00                

 

 

                Arthrocentesis major joint/bursa 2012 00:00:00            

     

 

                Colonoscopy     2012 00:00:00                 

 

                Office/outpatient visit, est, 2012 00:00:00               

  



                exp prob                                        

 

                Preventive checkup, est, 4064 2012 00:00:00              

   



                yrs                                             

 

                Urinalysis, non-automated, 2012 00:00:00                 



                w/scope                                         

 

                Draw Blood      2012 00:00:00 Julio C Cabrera F  

 

                Colonoscopy     2012 00:00:00                 

 

                Office/outpatient visit, est, 2011-10-07 00:00:00               

  



                detailed                                        

 

                METHYLPREDNISOLONE 40 MG INJ 2011-10-07 00:00:00                

 

 

                Arthrocentesis major joint/bursa 2011-10-07 00:00:00            

     

 

                THER/PROPH/DIAG INJ, SC/IM 2011-10-07 00:00:00                 

 

                Draw Blood      2011-10-04 00:00:00 Julio C Cabrera  

 

                Office/outpatient visit, est, 2011 00:00:00               

  



                exp prob                                        

 

                Office/outpatient visit, est, 2011 00:00:00               

  



                detailed                                        

 

                Draw Blood      2011 00:00:00 Julio C Cabrera  

 

                Office/outpatient visit, est, 2010 00:00:00               

  



                detailed                                        

 

                Urinalysis, non-automated, 2010 00:00:00                 



                w/scope                                         

 

                Preventive checkup, est, 63 2010-10-13 00:00:00              

   



                yrs                                             

 

                Blood, occult, qual, feces, 1-3 2010-10-13 00:00:00             

    



                simultn detrm                                   

 

                Pap Smear       2010-10-13 00:00:00                 

 

                Urinalysis, non-automated, 2010-10-13 00:00:00                 



                w/scope                                         

 

                Draw Blood      2010-10-05 00:00:00 Julio C Cabrera  

 

                Office/outpatient visit, est, 2010 00:00:00 Julio C Cabrera

  



                exp prob                                        

 

                Electrocardiogram (routine ECG), 2010 00:00:00 Chema Cabrera  



                complete                                        

 

                Office/outpatient visit, est, 2010 00:00:00               

  



                exp prob                                        

 

                TDAP VACCINE >7 IM 2010 00:00:00                 

 

                Immunization admin, 1 vaccine 2010 00:00:00               

  

 

                Draw Blood      2010 00:00:00 Julio C Cabrera  

 

                Office/outpatient visit, est, 2009 00:00:00 Julio C Cabrera

  



                detailed                                        

 

                Electrocardiogram (routine ECG), 2009 00:00:00 Chema Cabrera  



                complete                                        

 

                Preventive checkup, est, 4064 2009-10-01 00:00:00 Julio C Cabrera  



                yrs                                             

 

                Blood, occult, qual, feces, 1-3 2009-10-01 00:00:00 Julio C Cabrera  



                simultn detrm                                   

 

                Urinalysis, non-automated, 2009-10-01 00:00:00 Julio C Cabrera  



                w/scope                                         

 

                Draw Blood      2009 00:00:00 uJlio C Cabrera  

 

                Office/outpatient visit, [facility] [place], 2009 00:00:00               

  



                mod complex                                     

 

                Orthopedic Surgery 2004 00:00:00                 

 

                LEG SURGERY PROCEDURE 2001 00:00:00                 

 

                Tonsillectomy   1974 00:00:00                 

 

                Rhinoplasty                                     

 

                Orthopedic Surgery                                 

 

                Other                                           

 

                Tonsillectomy                                   







Results







           Test Description Test Time  Test Comments Text Results Atomic Results

 Result 

Comments









                MAMMOGRAPHY     2019                                      

                        [facility]                                



                                16:31:00                                  [address] [place] [phone] 



                                                                 



                                                                

--------------------------------------------------------------------------------

----------------                        



                                                                ----  ----------

------------------------------------------------------    

Patient:                                



                                                                AMANDA CHAMORRO   Birth:  1955   Sex: F  Address: [address] [place] [address] 

 Phone:                                 



                                                                [phone]   

             [place] [address]          Acct #: [id number]  

  Unit #:                               



                                                                [id number]    RE

 SEQ #: [id number]     Location:  IMAGE   Room #:    

Ordering: JOHANNY PARNELL                 



                                                    [facility]  Diagnosis: SCREENING MA

MMO 



                                                                

--------------------------------------------------------------------------------

----------------                        



                                                                ----  ----------

------------------------------------------------------      

Procedure Report                        



                                                                 Patient History

:   No known family history of cancer.   Benign excisional 

biopsy of both                          



                                                                breasts.   No Ho

rmone Replacement Therapy   Last mammogram was performed 1 

year and 4 months                       



                                                                ago.   Reason fo

r exam: screening, asymptomatic.      Full Field Digital 

Mammogram.      [facility]                     



                                                                MAMMO SCRN BILAT

 DIG WCAD/JAVID: 2019   2D/3D Procedure   3D 

Bilateral CC and MLO                    



                                                                view(s) were rochelle

en.   2D Bilateral CC and MLO view(s) were taken.   Prior 

study comparison:                       



                                                                2018

, [facility]  

[facility].  2016, bilateral mammogram, performed at 

[facility].                   



                                                                  The breast tis

ann marie is heterogeneously dense. This can limit the sensitivity 

and specificity of                      



                                                                the   study..   

  There is no suspicious mass,  calcification, or 

architectural distortion                



                                                                identified.     

 BI-RADS: Benign (BIRADS 2)      Recommendation:   Routine 

screening mammogram                     



                                                                of both breasts 

in 1 year.      Screening mammogram is recommended in one 

year or at the                          



                                                                recommendation o

f the referring provider.              Signed by: YVETTE WAHLEY MD                              



                                                    [id number]    cc: JOHANNY PANRELL ZSUZSANNA P MD 

 

                RADIOLOGY       2019                                      

                        [facility]                                



                                16:59:00                                  [address] [facility] [address] [phone] 



                                                                 



                                                                

--------------------------------------------------------------------------------

----------------                        



                                                                ----  ----------

------------------------------------------------------    

Patient:                                



                                                                AMANDA CHAMORRO   Birth:  1955   Sex: F  Address: [address] [place] [address] 

 Phone:                                 



                                                                [phone]   

             [place] [address]          Acct #: [id number]  

  Unit #:                               



                                                                [id number]    RE

Q SEQ #: [id number]     Location:  IMAGE   Room #:    

Ordering: ANGELA HOLLY               



                                                    Diagnosis: MECHANICAL FALL R

/O FX RT HIP 



                                                                

--------------------------------------------------------------------------------

----------------                        



                                                                ----  ----------

------------------------------------------------------       

HIP RIGHT 2 VIEWS                       



                                                                WITH PELVIS     

  History: MECHANICAL FALL R/O FX RT HIP    MECHANICAL FALL 

R/O FX RT HIP      2                    



                                                                views were obtai

eliel.  The mineralization is normal. There is no fracture or 

malalignment. There                     



                                                                 is no dislocati

on.   Soft tissues are unremarkable.      Impression: No 

acute osseous                           



                                                                abnormality.    

  Final report electronically signed by: Sunita Sen MD   

       Signed by:                       



                                                                SUNITA SEN II, MD [id number]    cc: SUNITA SEN II, MD, BRUCE PA                         









                POCT Glucose    2018-10-25 16:55:00                 









                          Test Item    Value        Reference Range Comments









                Glucose, POC (test code = Glucose, POC) 105 mg/dL       65- 99 m

g/dL    

 

                 ID (test code =  ID) Kayleen Liz            

      



POCT Glucose2018-10-25 13:01:00





                Test Item       Value           Reference Range Comments

 

                Glucose, POC (test code = Glucose, POC) 87 mg/dL        65- 99 m

g/dL    

 

                 ID (test code =  ID) Leela Cheek         

        



POCT Urinalysis [id number] 10:39:00





                Test Item       Value           Reference Range Comments

 

                Spec Gravity/POC (test code = Spec Gravity/POC) 1.015           

1.003-1.030     

 

                PH/POC (test code = PH/POC) 5               5.0-9.0         

 

                Leuk Esterase/POC (test code = Leuk Esterase/POC) Trace         

  Negative        

 

                Nitrite/POC (test code = Nitrite/POC) Negative        Negative  

      

 

                Protein/POC (test code = Protein/POC) Negative        Negative  

      

 

                UA Glucose/POC (test code = UA Glucose/POC) Negative        Nega

tive        

 

                Ketones, POC (test code = Ketones, POC) Negative        Negative

        

 

                Bilirubin/POC (test code = Bilirubin/POC) Negative        Negati

ve        

 

                Blood/POC (test code = Blood/POC) Negative        Negative      

  

 

                Urobilinogen/POC (test code = Urobilinogen/POC) 0.2 mg/dL       

0.2- 1.0 mg/dL  



[id number] 13:14:00   [address] [id number] [phone]     
--------------------------------------------------------------------------------

----------------
--------------------------------------------------------------------    Patient:
  AMANDA CHAMORRO   Birth:  1955   Sex: F  Address: [address] [place] [address]  
Phone: [phone]                [place] [address]          Acct #: 
[id number]     Unit #: [id number]    REQ SEQ #: [id number]     Location:  
IMAGE   Room #:    Ordering: JOHANNY CARTWRIGHT  Diagnosis: SCREENING MAMMO      
    -----------
--------------------------------------------------------------------------------

-------------------------------------------------------------------------      
Procedure Report      Patient History:   No known family history of cancer.   
Benign excisional biopsy of both breasts.   No Hormone Replacement Therapy   
Reason for exam: screening, asymptomatic.      Full Field Digital Mammogram.    
 [facility] Mammo SCRN Bilat Digital W CAD: 2018   Bilateral CC and MLO 
view(s) were taken.   Prior study comparison: 2017, left breast 
mammogram, performed at [facility].  2016, bilateral 
mammogram, performed at [facility].  2015, bilateral 
mammogram,    performed at [facility].   The breast tissue is 
heterogeneously dense. This can limit the sensitivity and specificity of the 
study..     There are stable benign appearing calcifications in the right 
breast. There is no other significant finding on   the study in either breast.  
   BI-RADS: Benign (BIRADS 2)      Recommendation:   Routine screening mammogram
of both breasts in 1 year.   Screening mammography is recommended in one year or
at the recommendation of the referring provider.      This examination was 
reviewed with a Computer Aided Breast Cancer Detection System. Signed by: 
SAMANTHA MAYFIELD MD [id number]    cc: JOHANNY PARNELL MICHAEL G [facility] Gmcimer9887-25-58 11:31:00





                Test Item       Value           Reference Range Comments

 

                POC Glucose (test code = POC Glucose) 88                  

      



Hauwaabylc8389-57-60 05:44:00





                Test Item       Value           Reference Range Comments

 

                Blood hemoglobin measurement (mass/volume) (test code 9.9       

      11.4-14.4       



                = 718-7)                                        



Ojtaktyagz8027-61-74 05:44:00





                Test Item       Value           Reference Range Comments

 

                Automated blood hematocrit (volume fraction) (test 30.0         

   33.3-41.4       



                code = 4544-3)                                  



White Blood Sqyls5129-70-89 12:20:00





                Test Item       Value           Reference Range Comments

 

                Blood leukocytes automated count (number/volume) (test 5.9      

       3.6-11.1        



                code = 6690-2)                                  



Red Blood Hawsj7722-45-43 12:20:00





                Test Item       Value           Reference Range Comments

 

                Blood erythrocytes automated count (number/volume) 4.06         

   3.69-4.88       



                (test code = 789-8)                                 



Mean Corpuscular Sxyeem9219-92-49 12:20:00





                Test Item       Value           Reference Range Comments

 

                Automated erythrocyte mean corpuscular volume (test 91.4        

    79.3-94.8       



                code = 787-2)                                   



Mean Corpuscular Nrbgqsynkq9730-41-78 12:20:00





                Test Item       Value           Reference Range Comments

 

                Automated erythrocyte mean corpuscular hemoglobin 30.2          

  26.8-33.2       



                (mass per erythrocyte) (test code = 785-6)                      

           



Mean Corpuscular Hemoglobin Oynzfig9250-08-32 12:20:00





                Test Item       Value           Reference Range Comments

 

                Automated erythrocyte mean corpuscular hemoglobin 33.0          

  33.5-35.5       



                concentration measurement (mass/volume) (test code =            

                     



                786-4)                                          



Red Cell Distribution Ewkst2018-15-29 12:20:00





                Test Item       Value           Reference Range Comments

 

                Automated erythrocyte distribution width ratio (test 13.8       

     12.0-15.1       



                code = 788-0)                                   



Platelet Fwxwn6253-24-92 12:20:00





                Test Item       Value           Reference Range Comments

 

                Automated blood platelet count (count/volume) (test 247         

    165353         



                code = 777-3)                                   



Mean Platelet Jqyquq7398-18-76 12:20:00





                Test Item       Value           Reference Range Comments

 

                Automated blood platelet mean volume measurement (test 8.9      

       7.5-10.6        



                code = 87746-1)                                 



Neutrophils (%) (Auto)2018 12:20:00





                Test Item       Value           Reference Range Comments

 

                Automated blood neutrophil count as percentage of 66.0          

  43.2-71.5       



                total leukocytes (test code = 770-8)                            

     



Lymphocytes (%) (Auto)2018 12:20:00





                Test Item       Value           Reference Range Comments

 

                Automated blood lymphocyte count as percentage of 23.3          

  16.8-43.4       



                total leukocytes (test code = 736-9)                            

     



Monocytes (%) (Auto)2018 12:20:00





                Test Item       Value           Reference Range Comments

 

                Automated blood monocyte count as percentage of total 7.6       

      4.6-12.4        



                leukocytes (test code = 5905-5)                                 



Eosinophils (%) (Auto)2018 12:20:00





                Test Item       Value           Reference Range Comments

 

                Automated blood eosinophil count as percentage of 2.2           

  0.7-7.8         



                total leukocytes (test code = 713-8)                            

     



Basophils (%) (Auto)2018 12:20:00





                Test Item       Value           Reference Range Comments

 

                Automated blood basophil count as percentage of total 0.9       

      0.2-1.2         



                leukocytes (test code = 706-2)                                 



Neutrophils # (Auto)2018 12:20:00





                Test Item       Value           Reference Range Comments

 

                Blood neutrophils automated count (number/volume) 3.9           

  1.9-7.2         



                (test code = 751-8)                                 



Lymphocytes # (Auto)2018 12:20:00





                Test Item       Value           Reference Range Comments

 

                Automated blood lymphocyte count (number/volume) (test 1.4      

       1.1-2.7         



                code = 731-0)                                   



Monocytes # (Auto)2018 12:20:00





                Test Item       Value           Reference Range Comments

 

                Blood monocytes automated count (number/volume) (test 0.4       

      0.3-0.8         



                code = 742-7)                                   



Eosinophils # (Auto)2018 12:20:00





                Test Item       Value           Reference Range Comments

 

                Automated blood eosinophil count (test code = 711-2) 0.1        

     0.0-0.5         



Basophils # (Auto)2018 12:20:00





                Test Item       Value           Reference Range Comments

 

                Automated blood basophil count (count/volume) (test 0.1         

    0.0-0.1         



                code = 704-7)                                   



Blood Urea Avhydnqr9005-26-31 12:20:00





                Test Item       Value           Reference Range Comments

 

                BUN (test code = 687555271) 19              9.8-20.1        



Fyibwzjzmm7123-96-02 12:20:00





                Test Item       Value           Reference Range Comments

 

                Creatinine blood (test code = 779341730) 0.90            0.57-1.

11       



IGSJAWOMUL1052-93-83 11:06:00   [address] [address]   [phone]     
--------------------------------------------------------------------------------

----------------
--------------------------------------------------------------------    Patient:
  AMANDA CHAMORRO   Birth:  1955   Sex: F  Address: [address] [place] [address]  
Phone: [phone]                [place] [address]          Acct #: 
[id number]     Unit #: [id number]    [facility] SEQ #: [id number]     Location:  
IMAGE   Room #:    Ordering: KHOI DALTON MD  Diagnosis: L BREAST DENSITY      
    -----------
--------------------------------------------------------------------------------

-------------------------------------------------------------------------       
MAMMO SINGLE VIEW LEFT DIGITAL, US BREAST LEFT LIMITED      Clinical 
Information:  L BREAST DENSITY      EXAM: Diagnostic Mammogram, Digital LSCC, 
LSMLO, LML and Tomosynthesis      ACCESSION: [id number], [id number]     
EXAM DATE AND TIME:2017 10:43 AM      PRIOR EXAM(S): 2016, 
2015, 2014, 2013, 2012, 10/28/2011.      TISSUE DENSITY:
The breast tissue is heterogeneously dense (51% - 75%)      R2 image  
Computer Aided Detection (CAD) was utilized.      FINDINGS: The spot compression
views and Tomosynthesis demonstrate a small circumscribed benign-appearing 
nodule in the   posterior medial left breast. There is otherwise no evidence of 
a suspicious abnormality.      ULTRASOUND: Directed ultrasound of the left 
breast demonstrates a 4.2 x 3.5 x 3.6 mm cyst in the 9:00 position, 6.2 cm   
fromthe nipple corresponding to the mammogram finding. There is otherwise no 
evidence of a suspicious abnormality.      ASSESSMENT: ACR BI-RADS 2 - Benign.  
   Patient was notified of results by letter.   RECOMMENDATION: Routine 
screening mammogram in 1 Year      BI-RADS 2 -- benign findings      Final 
report electronically signed by: Khio Dalton MD              Signed by: KHOI DALTON MD 17 [id number] 11:06:00   [address] [address] [phone]    
 
--------------------------------------------------------------------------------

----------------
--------------------------------------------------------------------    Patient:
  AMANDA CHAMORRO   Birth:  1955   Sex: F  Address: [address] [place] [address]  
Phone: [phone]                [place] [address]          Acct #: 
[id number]     Unit #: [id number]    RE SEQ #: [id number]     Location:  
IMAGE   Room #:    Ordering: KHOI DALTON MD  Diagnosis: L BREAST DENSITY      
    -----------
--------------------------------------------------------------------------------

-------------------------------------------------------------------------       
MAMMO SINGLE VIEW LEFT DIGITAL, US BREAST LEFT LIMITED      Clinical 
Information:  L BREAST DENSITY      EXAM: Diagnostic Mammogram, Digital LSCC, 
LSMLO, LML and Tomosynthesis      ACCESSION: [id number], [id number]     
EXAM DATE AND TIME:2017 10:43 AM      PRIOR EXAM(S): 2016, 
2015, 2014, 2013, 2012, 10/28/2011.      TISSUE DENSITY:
The breast tissue is heterogeneously dense (51% - 75%)      R2 image  
Computer Aided Detection (CAD) was utilized.      FINDINGS: The spot compression
views and Tomosynthesis demonstrate a small circumscribed benign-appearing 
nodule in the   posterior medial left breast. There is otherwise no evidence of 
a suspicious abnormality.      ULTRASOUND: Directed ultrasound of the left 
breast demonstrates a 4.2 x 3.5 x 3.6 mm cyst in the 9:00 position, 6.2 cm   
fromthe nipple corresponding to the mammogram finding. There is otherwise no 
evidence of a suspicious abnormality.      ASSESSMENT: ACR BI-RADS 2 - Benign.  
   Patient was notified of results by letter.   RECOMMENDATION: Routine 
screening mammogram in 1 Year      BI-RADS 2 -- benign findings      Final 
report electronically signed by: Khoi Dalton MD              Signed by: KHOI DALTON MD 17 [id number] 15:34:00   [address] [id number] [phone]    
 
--------------------------------------------------------------------------------

----------------
--------------------------------------------------------------------    Patient:
  AMANDA CHAMORRO   Birth:  1955   Sex: F  Address: [address] [place] [address]  
Phone: [phone]                [place] [address]          Acct #: 
[id number]     Unit #: [id number]    [facility] SEQ #: [id number]     Location:  
IMAGE   Room #:    Ordering: JERRI CARTWRIGHT  Diagnosis: SCREENING CORNELL    
      ---------
--------------------------------------------------------------------------------

---------------------------------------------------------------------------     
   MAMMO SCRN BILAT DIGITAL W CAD       History: SCREENING CORNELL    SCREENING 
CORNELL    Clinical: Screening      Technique: Bilateral digital mammogram. CC and 
MLO views are performed in each breast.Computer aided detection   performed.    
  Prior Studies: 2015, 2014.      Findings: Asymmetric densities in 
the medial slightly upper aspect of the left breast posteriorly about the T9 to 
the   nipple. Spot compression views and 3 tomography and ultrasound are 
needed..  There is no suspicious mass, calcification   or distortion otherwise. 
Breast tissue is heterogeneously dense..      Impression: ACR Category 0: 
Incomplete. Needs additional imaging evaluation.      Recommendation: Recall for
additional imaging.         PATIENT NOTIFIED BY LETTER.      BI-RADS 0 -- 
incomplete assessment      Final report electronically signed by: Sunita Sen MD              Signed by: SUNITA SEN II, MD 16 [id number]
2016-10-14 19:06:00   [address] [id number] [phone]     
--------------------------------------------------------------------------------

----------------
--------------------------------------------------------------------    Patient:
  AMANDA CHAMORRO   Birth:  1955   Sex: F  Address: [address] [place] [address]  
Phone: [phone]                [place] [address]          [facility] 
[id number]     Unit #: [id number]    [facility] SEQ #: [id number]     Location:  [facility] 
 Room #:    Ordering: JERRI CARTWRIGHT  Diagnosis:            
--------------------
--------------------------------------------------------------------------------

----------------------------------------------------------------        Left 
foot, 3 views      History: Metatarsalgia  Findings: The DIP joints are not well
seen due to the toe position. There is no evidence of articular space narrowing.
  There is diffuse osteopenia. There is a minimal calcaneal spur. No acute 
fracture or dislocation.  No focal lytic or   sclerotic osseous abnormality.  
The articular spaces are within normal limits.  No radiopaque foreign bodies.   
    IMPRESSION:     1. No acute fracture or dislocation   2. Calcaneal spur     
Final report electronically signed by: LINDA Roaigned by: NESSA KAPLAN MD 10/14/16 [id number]



Assessments







                Condition Name  Status          Diagnosis Date  Treating Clinici

an

 

                Acquired spondylolisthesis Active          2020-10-14 13:24:53 

 

                Lumbar disc prolapse with radiculopathy Active          2020-10-

12 14:46:31 

 

                Lumbar spondylolisthesis Active          2020-10-12 14:46:34 

 

                Low back pain   Active          2020-10-12 14:46:33 

 

                Degeneration of lumbar intervertebral Active          2020-10-14

 13:24:53 



                disc                                            

 

                Lumbago with sciatica Active          2020-10-14 13:24:53 

 

                Low back pain   Active          2020-10-08 15:47:01 

 

                Low back pain   Active          2020-10-06 09:55:00 

 

                Low back pain   Active          2020 15:18:56 

 

                Low back pain   Active          2020 16:11:32 

 

                Degeneration of lumbosacral Active          2020 16:11:32 



                intervertebral disc                                 

 

                Lumbosacral neuritis Active          2020 16:11:32 

 

                Low back pain   Active          2020 16:30:41 

 

                Low back pain   Active          2020 14:31:46 

 

                Lumbosacral spondylosis without Active          2020 16:11

:29 



                myelopathy                                      

 

                Low back pain   Active          2020 16:11:20 

 

                Degeneration of lumbosacral Active          2020 16:11:19 



                intervertebral disc                                 

 

                Long-term drug therapy Active          2020 15:38:58 

 

                Low back pain   Active          2020 13:16:57 

 

                Lumbar spondylolisthesis Active          2020 14:09:24 

 

                Impaired glucose tolerance Active          2020 14:01:59 

 

                Hypothyroidism  Active          2020 14:02:02 

 

                Body mass index 25-29 - overweight Active          2020 10

:33:24 

 

                Postmenopausal osteoporosis Active          2020 12:06:30 

 

                Lumbar radiculopathy Active          2020 16:38:59 

 

                GERD without esophagitis Active                          

 

                Hypothyroidism, unspecified Active                          

 

                Hyperlipidemia  Active                          

 

                Type 2 diabetes mellitus with Active                          



                unspecified complications                                 

 

                Low back pain   Active          2020 13:55:45 

 

                Degeneration of lumbosacral Active          2020 13:55:46 



                intervertebral disc                                 

 

                Lumbosacral neuritis Active          2020 13:55:47 

 

                Low back pain   Active          2020-03-10 11:54:30 

 

                Degeneration of lumbosacral Active          2020-03-10 11:54:31 



                intervertebral disc                                 

 

                Lumbosacral neuritis Active          2020-03-10 11:54:32 

 

                Low back pain   Active          2020 16:05:44 

 

                Degeneration of lumbosacral Active          2020 16:05:44 



                intervertebral disc                                 

 

                Lumbosacral neuritis Active          2020 16:05:45 

 

                Low back pain   Active          2020 11:11:26 

 

                Degeneration of lumbosacral Active          2020 11:11:26 



                intervertebral disc                                 

 

                Lumbosacral neuritis Active          2020 11:11:27 

 

                Osteopenia      Active          2020 10:22:44 

 

                Varicose veins of lower extremity Active          2019 17:

14:57 

 

                Peripheral arteriovenous malformation Active          2019

 17:15:03 

 

                Cellulitis      Active          2019 17:15:13 

 

                GERD without esophagitis Active                          

 

                Hyperlipidemia  Active                          

 

                Hypothyroidism, unspecified Active                          

 

                Impaired fasting glucose Active                          

 

                Abnormal finding of blood chemistry Active                      

    

 

                Other specified disorder of bone Active                         

 



                density                                         

 

                GERD without esophagitis Active                          

 

                Hyperlipidemia  Active                          

 

                Hypothyroidism, unspecified Active                          

 

                Impaired fasting glucose Active          2019 00:00:00 

 

                Cellulitis      Active          2019 09:20:05 

 

                Varicose veins of lower extremity Active          2019 17:

02:33 

 

                Varicose veins of lower extremity Active          2019-10-02 17:

09:17 

 

                Impaired glucose tolerance Active          2019 10:02:12 

 

                Hypothyroidism  Active          2019 10:02:13 

 

                Abdominal bloating Active          2019 10:44:44 

 

                Chronic constipation Active          2019 10:44:49 

 

                Varicose veins of lower extremity Active          2019 09:

08:55 

 

                Hypothyroidism, unspecified Active                          

 

                Hyperlipidemia  Active                          

 

                GERD without esophagitis Active                          

 

                Encounter for screening mammogram for Active                    

      



                Ca of breast                                    

 

                Impaired fasting glucose Active                          

 

                Impaired glucose tolerance Active          2019 09:50:49 

 

                Hypothyroidism  Active          2019 09:50:50 

 

                Varicose veins of lower extremity Active          2019 15:

27:27 

 

                Hypothyroidism, unspecified Active                          

 

                Hypothyroidism, unspecified Active                          

 

                Impaired fasting glucose Active                          

 

                Abnormal finding of blood chemistry Active                      

    

 

                Hyperlipidemia, unspecified Active                          

 

                Impaired fasting glucose Active                          

 

                Hyperlipidemia, unspecified Active                          

 

                Hypothyroidism, unspecified Active                          

 

                Abnormal finding of blood chemistry Active                      

    

 

                Encounter for adult annual physical Active                      

    



                exam w/ abnormal finding                                 

 

                Hypothyroidism  Active                          

 

                Hyperlipidemia  Active                          

 

                Impaired fasting glucose Active                          

 

                UTI             Active                          

 

                Encounter for screening mammogram for Active                    

      



                Ca of breast                                    

 

                Body mass index (BMI) 26.0-26.9, adult Active          2017

2 00:00:00 

 

                Cervical stump prolapse Active                          

 

                Exposure to varicella Active                          

 

                Abnormal Pap smear of cervix Active                          

 

                Hyperlipidemia  Active                          

 

                Hypothyroidism  Active                          

 

                Impaired fasting glucose Active                          

 

                GERD without esophagitis Active                          

 

                Other specified disorder of bone Active                         

 



                density                                         

 

                Abnormal Pap smear of cervix Active                          

 

                Encounter for screening colonoscopy Active                      

    

 

                Abnormal Pap smear of cervix Active                          

 

                Costochondritis Active                          

 

                Chest pain      Active                          

 

                Encntr for general adult medical exam Active                    

      



                w/o abnormal findings                                 

 

                Hyperlipidemia  Active                          

 

                Hypothyroidism  Active                          

 

                Impaired fasting glucose Active                          

 

                Encounter for screening colonoscopy Active                      

    

 

                Encounter for screening mammogram for Active                    

      



                Ca of breast                                    

 

                Body mass index (BMI) 27.0-27.9, adult Active          

8 00:00:00 

 

                Metatarsalgia, left foot Active                          

 

                Hyperlipidemia  Active                          

 

                Impaired fasting glucose Active                          

 

                Hypothyroidism  Active                          

 

                GERD without esophagitis Active                          

 

                Encntr for general adult medical exam Active                    

      



                w/o abnormal findings                                 

 

                Impaired fasting glucose Active                          

 

                Hyperlipidemia  Active                          

 

                Hypothyroidism  Active                          

 

                GERD without esophagitis Active                          

 

                Cellulitis of buttock Active                          

 

                Cellulitis of groin Active                          

 

                Body mass index (BMI) 26.0-26.9, adult Active          2015

0 00:00:00 

 

                Abscess         Active                          

 

                Abnormality, respiratory NEC Active          2015 00:00:00

 







Encounters







        Start   End     Encounter Admission Attending Care    Care    Encounter



        Date/Time Date/Time Type    Type    Clinicians Facility Department ID

 

        2020-10-13 2020-10-13 Rea Spain [id number]

201



        00:00:00 00:00:00 Windy                 Surgical Surgical Jabier LUCAS: [id number]                 [facility] 



                        [place], [place]                                      



                        [address],                                 



                        Ph.                                     



                        [phone]                                 

 

        2020-10-08 2020-10-08 Jarvis Spain [place] 8543





        00:00:00 00:00:00 Bennita                 Surgical Surgical 008



                        [facility] 



                        -DPT: [address]                                 



                        [address]                                 



                        [place], [place]                                 



                        [address],                                 



                        Ph. (252)                                 



                        [phone]                                 

 

        2020-10-06 2020-10-06 Jarvis Pelayot [place] 8543





        00:00:00 00:00:00 Bennita                 Surgical Surgical 006



                        [facility] 



                        -DPT: [address]                                 



                        [address], [place]                                 



                        [address],                                 



                        Ph. (252)                                 



                        [phone]                                 

 

        2020 Jarvis Spain [place] 8543





        00:00:00 00:00:00 Bennita                 Surgical Surgical 929



                        [facility] 



                        -DPT: [address]                                 



                        [address], [place]                                 



                        [address],                                 



                        Ph. (252)                                 



                        [phone]                                 

 

        2020 Ken Vilal 116





        00:00:00 00:00:00 MD Batsheva:                 Pain    Pain Center 28



                        [facility]                    [facility], [address]                                 



                        [address], [place], [place]                                 



                        [address],                                 



                        Ph. (252)                                 



                        [phone]                                 

 

        2020 Jarvis Pelayot [place] 8543





        00:00:00 00:00:00 Bennita                 Surgical Surgical 921



                        [facility] 



                        -DPT: [address]                                 



                        [address], [place]                                 



                        [address],                                 



                        Ph. (252)                                 



                        [phone]                                 

 

        2020 Jarvis [place] 8543





        00:00:00 00:00:00 Bennita                 Surgical Surgical 918



                        [facility] 



                        -DPT: [address]                                 



                        [address], [place]                                 



                        [address],                                 



                        Ph. (252)                                 



                        [phone]                                 

 

        2020 Ken Villa 116

2_202008



        00:00:00 00:00:00 MD Batsheva:                 Pain    Pain Center 17



                        [address]                    [address] [facility]      



                        [facility]                                 



                        [address]                                 



                        [address], [place], [place]                                 



                        [address],                                 



                        Ph. [phone]                                 

 

        2020 Nessa Pelayot 617[address]

_0



        00:00:00 00:00:00 Brizuela: 3714                 Surgical Surgical 811



                        [facility] [address]                                 



                        [address], [place], [place]                                 



                        [address],                                 



                        Ph. (243)                                 



                        [phone]                                 

 

        2020 Ne ILDEFONSO                 Judit Spain [id number]

_



        00:00:00 00:00:00 ZARA MaC:                 Medical Medical 0609



                        [address] [facility],                                 



                        [address],                                 



                        [place], [place]                                      



                        [address],                                 



                        Ph. [phone]                                 

 

        2020 Outpatient         Tiarra, [facility]    [facility]    [id number]



        11:47:00 11:47:00                 Johanny                   

 

        2020 Outpatient                 [facility]    SB[facility]    [id number]



        11:47:00 11:47:00                                         

 

        2020 Robert Villa [place] 1162

_06



        00:00:00 00:00:00 [place]                  Pain    Pain Center 05



                        Arjay, :                 Center [facility] PC      



                        [address] [facility],                                 



                        [address],                                 



                        [place], [place]                                      



                        [address],                                 



                        Ph. [phone]                                 

 

        2020 Outpatient         [place], [facility]    SB[facility]    [id number]



        14:06:00 14:06:00                 Johanny                   

 

        2020 Outpatient                 [facility]    SB[facility]    [id number]



        09:30:00 09:30:00                                         

 

        2020 Outpatient         [place], [facility]    SB[facility]    [id number]



        09:30:00 09:30:00                 Johanny                   

 

        2020 Jaci Minerva                 [facility] 1162

_202005



        00:00:00 00:00:00 Hieu,                 Pain    Pain Center 06



                        PA-C: [address]                 Center [facility]      



                        [facility]                                 



                        [address]                                 



                        [address], [place], [place]                                 



                        [address],                                 



                        Ph. (536)                                 



                        [phone]                                 

 

        2020-03-10 2020-03-10 Ken Awad                 [facility] 116

2_03



        00:00:00 00:00:00 MD Batsheva:                 Pain    Pain Center 10



                        [address]                    [facility]      



                        [address]                                 



                        [address]                                 



                        [address], [place], [place]                                 



                        [address],                                 



                        Ph. (252)                                 



                        [phone]                                 

 

        2020 Outpatient                 [facility]    SBFP    [id number]



        00:00:00 00:00:00                                         

 

        2020 Jaci Villa [place] 1162

_202002



        00:00:00 00:00:00 Hieu,                 Pain    Pain Center 06



                        PA-C: [address]                 Center [facility]      



                        [address]                                 



                        [address]                                 



                        [address], [place], [place]                                 



                        [address],                                 



                        Ph. (252)                                 



                        [phone]                                 

 

        2020 Ken Awad                 [facility] 116

2_



        00:00:00 00:00:00 MD Batsheva:                 Pain    Pain Center 14



                        [address]                    [address] [facility]      



                        [address]                                 



                        [address]                                 



                        [address], [place], [place]                                 



                        [address],                                 



                        Ph. (252)                                 



                        [phone]                                 

 

        2020 Leela Spain [id number]

020



        00:00:00 00:00:00 Juan David                 Medical Medical 0102



                        MD: [facility] 



                        Medical [facility], [place], [place]                                 



                        [address],                                 



                        Ph. (252)                                 



                        2019 Outpatient                 [facility]    SBFP    [id number]



        13:06:00 13:06:00                                         

 

        2019 Outpatient                 [facility]    SB[facility]    [id number]



        09:17:00 09:17:00                                         

 

        2019 Neil Duane [place] [address]

_20191



        00:00:00 00:00:00 MD Librado:                 Surgical Surgical 118



                        [address]                 [place], [place]                                 



                        [address],                                 



                        Ph. (252)                                 



                        960-2019 Outpatient                 [facility]    SBFP    [id number]



        08:45:00 08:45:00                                         

 

        2019 Outpatient                 [facility]    SBFP    [id number]



        08:30:00 08:30:00                                         

 

        2019 Adelsoil Duane [place] [address]

_



        00:00:00 00:00:00 MD Librado:                 Surgical Surgical 104



                        [address] [place], [place]                                 



                        [address],                                 



                        Ph. (252)                                 



                        [phone]                                 

 

        2019-10-02 2019-10-02 Neil Duane                 Judit [place] [address]

_



        00:00:00 00:00:00 MD Librado:                 Surgical Surgical 002



                        [address] [place], [place]                                 



                        [address],                                 



                        Ph. (100)                                 



                        2019 Outpatient                 [facility]    SBFP    [id number]



        10:34:00 10:34:00                                         

 

        2019 Outpatient                 [facility]    SBFP    [id number]



        10:23:00 10:23:00                                         

 

        2019 Outpatient CAMILLE MA,  [facility]     [facility]     [id number]

[id number]



        11:35:00 11:35:00                 NE                 8

 

        2019 Ne Pelayot [id number]

_



        00:00:00 00:00:00 WAYNE Ma:                 [place] [address] [address]                 [facility], [place], [place]                                 



                        [address],                                 



                        Ph. ([phone])                                 



                        2019 Adelso Duanejer [place] [address]

_0



        00:00:00 00:00:00 MD Librado:                 Surgical Surgical 716



                        [address] [place], [place]                                 



                        [address],                                 



                        Ph. (252)                                 



                        2019 Outpatient CAMILLE PARNELL, [facility]     [id number]

[id number]



        10:14:00 10:14:00                 JOHANNY                   0

 

        2019 Outpatient                 [facility]    SB[facility]    [id number]



        10:00:00 10:00:00                                         

 

        2019 Ne A                 Judit [place] [id number]

_



        00:00:00 00:00:00 WAYNE Ma:                 Medical Medical 0430



                        [address]                 [facility] 



                        [address],                                  



                        [place], [place]                                 



                        [address],                                 



                        Ph. ([phone])                                 



                        2019 Neil Duane [place] [address]

_20190



        00:00:00 00:00:00 MD Librado:                 Surgical Surgical 417



                        [address]                 [place]                                 



                        [address],                                 



                        Ph. [phone] Outpatient KAITLYNN GASCA     [facility]     [id number]

[id number]



        15:53:00 15:53:00                 ANGELA                   1

 

        2018 Outpatient                 [facility]    [facility]    [id number]



        11:30:00 11:30:00                                         

 

        2018 Outpatient EL              [facility]     [id number]

803_



        11:50:25 12:56:36                                         [id number]



                                                                025

 

        2018 Outpatient                 [facility]  [facility]  [id number]

8846



        11:50:25 12:56:36                                         

 

        2018 Outpatient EL              [facility]     [id number]

803_



        00:00:00 00:00:00                                         2018 Outpatient                 [facility]  [facility]  [id number]

7896



        00:00:00 00:00:00                                         

 

        2018 Outpatient                 [facility]    [facility]    [id number]



        10:30:00 10:30:00                                         

 

        2018-10-25 2018-10-26 X       EL      [facility]  [place]     [id number]

8279_



        11:02:46 09:13:00                 EUNICE                 [id number]



                                                                246

 

        2018-10-25 2018-10-26 Outpatient                 [facility]  [facility]  [id number]

2831



        11:02:46 09:13:00                                         

 

        2018-10-26 2018-10-26 Outpatient                 [facility]  [facility]  [id number]

1724



        00:00:00 00:00:00                                         

 

        2018-10-25 2018-10-25 S       EL      [facility]  [place]     [id number]

8279_



        14:00:00 14:00:00                 EUNICE                 [id number]



                                                                000

 

        2018-10-25 2018-10-25 S       EL      [facility]  [place]     [id number]

8279_



        13:15:00 13:15:00                 EUNICE                 [id number]



                                                                500

 

        2018-10-25 2018-10-25 S                       [facility]  [place]     [id number]

_



        00:00:00 00:00:00                                         [id number]

 

        2018-10-19 2018-10-19 Outpatient EL              [facility]  [place]     [id number]

875_



        10:52:23 23:59:00                                         [id number]



                                                                223

 

        2018-10-19 2018-10-19 Outpatient EL              [facility]  [facility]     [id number]

208_



        09:18:36 10:24:28                                         [id number]



                                                                836

 

        2018-10-19 2018-10-19 Outpatient                 [facility]  [facility]  [id number]

4718



        09:18:36 10:24:28                                         

 

        2018-10-19 2018-10-19 Outpatient EL              [facility]  [place]     [id number]

875_



        00:00:00 00:00:00                                         [id number]

 

        2018-10-19 2018-10-19 Outpatient EL              [facility]  [facility]     [id number]

208_



        00:00:00 00:00:00                                         [id number]

 

        2018-10-19 2018-10-19 S                       [facility]  [place]     [id number]

_



        00:00:00 00:00:00                                         2018 Outpatient EL              [facility]  [facility]     [id number]

744_



        12:59:32 13:39:04                                         [id number]



                                                                932

 

        2018 Outpatient                 [facility]  [facility]  [id number]

1339



        12:59:32 13:39:04                                         

 

        2018 Outpatient EL              [facility]  [facility]     [id number]

744_



        10:47:55 12:08:21                                         [id number]



                                                                755

 

        2018 Outpatient                 [facility]  [facility]  [id number]

1610



        10:47:55 12:08:21                                         

 

        2018 Outpatient EL              [facility]  [facility]     [id number]

744_



        00:00:00 00:00:00                                         2018 Outpatient EL              [facility]     [id number]

058_



        00:00:00 00:00:00                                         2018 Outpatient EL              [facility]  [facility]     [id number]

968_



        09:36:36 11:58:16                                         [id number]



                                                                636

 

        2018 Outpatient                 [facility]  [facility]  [id number]

[id number]



        09:36:36 11:58:16                                         

 

        2018 Outpatient EL              [facility]     [id number]

968_



        00:00:00 00:00:00                                         2018 Outpatient                 [facility]  [facility]  [id number]

[id number]



        00:00:00 00:00:00                                         

 

        2018 Outpatient                 [facility]    [facility]    [id number]



        10:00:00 10:00:00                                         

 

        2018 Outpatient EL      TIARRA, [facility]     [facility]     [id number]

[id number]



        10:29:00 10:29:00                 JOHANNY                   0

 

        2018 Inpatient EL      [facility]     [facility]     [id number]

[id number]



        15:54:00 14:20:00                 JAZLYN LAZCANO                 2

 

        2017 Outpatient                 [facility]    [facility]    [id number]



        10:15:00 10:15:00                                         

 

        2017 Outpatient                 [facility]    [facility]    [id number]



        10:00:00 10:00:00                                         

 

        2017 Outpatient EL      POP, ANCA [facility]     [facility]     

[id number]



        00:16:00 00:16:00                                         5

 

        2017-06-15 2017-06-15 Outpatient                 [facility]    [facility]    [id number]



        10:15:00 10:15:00                                         

 

        2017 Outpatient CAMILLE DALTON, [facility]     [facility]     [id number]

[id number]



        10:16:00 10:16:00                 KHOI                    4

 

        2017 Outpatient                 [facility]    SB[facility]    [id number]



        10:54:00 10:54:00                                         

 

        2016 Outpatient EL      NUNEZ, [facility]     [facility]     

[id number]



        13:49:00 13:49:00                 JERRI                 7

 

        2016 Outpatient                 [facility]    [facility]    [id number]



        10:30:00 10:30:00                                         

 

        2016 Outpatient EL      HELESTEVAN,   [facility]     [facility]     [id number]

[id number]



        10:57:00 10:57:00                 KIKI                  5

 

        2016 Outpatient                 [facility]    [facility]    [id number]



        09:45:00 09:45:00                                         

 

        2016 Outpatient                 [facility]    [facility]    [id number]



        11:15:00 11:15:00                                         

 

        2016-10-14 2016-10-14 Outpatient KAITLYNN CADENA     [facility]     

[id number]



        17:01:00 17:01:00                 JERRI Stone

 

        2016-10-14 2016-10-14 Outpatient                 [facility]    [facility]    [id number]



        15:00:00 15:00:00                                         

 

        2016 Outpatient                 [facility]    [facility]    [id number]



        09:15:00 09:15:00                                         

 

        2015 Outpatient                 [facility]    [facility]    [id number]



        10:00:00 10:00:00                                         

 

        2015-12-10 2015-12-10 Outpatient                 [facility]    [facility]    [id number]



        14:45:00 14:45:00                                         

 

        2015 Outpatient                 [facility]    [facility]    [id number]



        10:45:00 10:45:00                                         

 

        2015 Outpatient                 [facility]    [facility]    [id number]



        15:30:00 15:30:00                                         

 

        2015 Outpatient                 [facility]    SB[facility]    [id number]



        09:45:00 09:45:00                                         

 

        2015 Outpatient                 [facility]    [facility]    [id number]



        16:00:00 16:00:00                                         

 

        2015 Outpatient                 [facility]    SB[facility]    [id number]



        08:30:00 08:30:00                                         

 

        2014-12-15 2014-12-15 Outpatient                 [facility]    [facility]    [id number]



        15:45:00 15:45:00                                         

 

        2014 Outpatient                 [facility]    SB[facility]    [id number]



        09:15:00 09:15:00                                         

 

        2014 Outpatient                 [facility]    SB[facility]    [id number]



        16:00:00 16:00:00                                         

 

        2014 Outpatient                 [facility]    [facility]    [id number]



        14:00:00 14:00:00                                         

 

        2014-10-14 2014-10-14 Outpatient                 [facility]    [facility]    [id number]



        16:30:00 16:30:00                                         

 

        2014 Outpatient                 [facility]    SB[facility]    [id number]



        11:00:00 11:00:00                                         

 

        2014 Outpatient                 [facility]    [facility]    [id number]



        17:15:00 17:15:00                                         

 

        2014 Outpatient                 [facility]    SB[facility]    [id number]



        14:00:00 14:00:00                                         

 

        2014-06-10 2014-06-10 Outpatient                 [facility]    [facility]    [id number]



        09:45:00 09:45:00                                         

 

        2014 Outpatient                 [facility]    [facility]    [id number]



        11:45:00 11:45:00                                         

 

        2013 Outpatient                 [facility]    [facility]    [id number]



        08:15:00 08:15:00                                         

 

        2013-10-04 2013-10-04 Outpatient                 [facility]    [facility]    [id number]



        10:15:00 10:15:00                                         

 

        2013-10-01 2013-10-01 Outpatient                 [facility]    [facility]    [id number]



        10:00:00 10:00:00                                         

 

        2013 Outpatient                 [facility]    [facility]    [id number]



        10:00:00 10:00:00                                         

 

        2013 Outpatient                 [facility]    [facility]    [id number]



        08:30:00 08:30:00                                         

 

        2012-10-12 2012-10-12 Outpatient                 [facility]    [facility]    [id number]



        10:45:00 10:45:00                                         

 

        2012-10-05 2012-10-05 Outpatient                 [facility]    [facility]    [id number]



        10:00:00 10:00:00                                         

 

        2012-10-02 2012-10-02 Outpatient                 [facility]    [facility]    [id number]



        09:30:00 09:30:00                                         

 

        2012 Outpatient                 [facility]    [facility]    [id number]



        09:00:00 09:00:00                                         

 

        2012 Outpatient                 [facility]    [facility]    [id number]



        11:00:00 11:00:00                                         

 

        2012 Outpatient                 [facility]    [facility]    [id number]



        11:00:00 11:00:00                                         

 

        2012 Outpatient                 [facility]    [facility]    [id number]



        10:15:00 10:15:00                                         

 

        2011-10-07 2011-10-07 Outpatient                 [facility]    [facility]    [id number]



        09:00:00 09:00:00                                         

 

        2011-10-04 2011-10-04 Outpatient                 [facility]    [facility]    [id number]



        08:30:00 08:30:00                                         

 

        2011 Outpatient                 [facility]    [facility]    [id number]



        17:45:00 17:45:00                                         

 

        2011 Outpatient                 [facility]    [facility]    [id number]



        10:00:00 10:00:00                                         

 

        2011 Outpatient                 [facility]    [facility]    [id number]



        09:15:00 09:15:00                                         

 

        2010 Outpatient                 [facility]    [facility]    [id number]



        11:45:00 11:45:00                                         

 

        2010-10-13 2010-10-13 Outpatient                 [facility]    [facility]    [id number]



        09:15:00 09:15:00                                         

 

        2010-10-05 2010-10-05 Outpatient                 [facility]    [facility]    [id number]



        10:00:00 10:00:00                                         

 

        2010 Outpatient                 [facility]    [facility]    [id number]



        15:00:00 15:00:00                                         

 

        2010 Outpatient                 [facility]    [facility]    [id number]



        10:30:00 10:30:00                                         

 

        2010 Outpatient                 [facility]    [facility]    [id number]



        10:00:00 10:00:00                                         

 

        2009 Outpatient                 [facility]    [facility]    [id number]



        16:15:00 16:15:00                                         

 

        2009-10-01 2009-10-01 Outpatient                 [facility]    [facility]    [id number]



        09:00:00 09:00:00                                         

 

        2009 Outpatient                 [facility]    [facility]    [id number]



        09:30:00 09:30:00                                         

 

        2009 Outpatient                 [facility]    [facility]    [id number]



        09:00:00 09:00:00                                         







Family History







             Family Member Diagnosis    Comments     Start Date   Stop Date

 

             Father       [id number]                 2015 00:00:00 



                                                                 00:00:00

 

             Father       Hypertension (Cause Of              2009 00:00:0

0 2009



                          Death)                                 00:00:00

 

             Sister       Alive and well              2009 00:00:00 



                                                                 00:00:00

 

             Mother       Alive and well              2009 00:00:00 



                                                                 00:00:00







Immunizations







           Ordered    Filled     Date       Status     Comments   Refusal



           Immunization Name Immunization Name                                  

Reason

 

           Influenza,            2019 Completed  Source: New 



           injectable,            00:00:00              Immunization 



           trivalent,                                  Record     



           adjuvanted,                                             



           preservative free,                                             



           65 years+, Fluad                                             



           [id number]                                              

 

           Pneumococcal            2019 Completed  Source: New 



           conjugate PCV 13            00:00:00              Immunization 



                                                       Record     

 

           pneumococcal            2019 Completed             



           polysaccharide            00:00:00                         



           PPV23                                                  

 

           influenza,            2019 Completed             



           injectable,            00:00:00                         



           quadrivalent                                             

 

           Influenza,            2018 Completed  Source: New 



           injectable, MDCK,            00:00:00              Immunization 



           preservative free                                  Record     



           Flucelvax Quad                                             



           2018-2019Y                                             

 

           influenza,            2018-10-08 Completed             



           unspecified            00:00:00                         



           formulation                                             

 

           influenza,            2018 Completed             



           injectable,            00:00:00                         



           quadrivalent                                             

 

           FLU VACCINE            2017 Cancelled  Source: New 



                                 00:00:00              Immunization 



                                                       Record     

 

           influenza,            2012 Completed             



           seasonal,             00:00:00                         



           injectable                                             

 

           Tdap                  2010 Completed  Source: New 



                                 00:00:00              Immunization 



                                                       Record     

 

           Vaccination            Unknown    Completed             







Payers







             Payer Name   Policy Type  Policy Number Effective Date Expiration D

ate

 

             [employer]                 [id number] 2018 00:00:00 



             OPTIONS/PPO/ADV                                        







Plan of Treatment







                Planned Activity Planned Date    Details         Comments

 

                Future Scheduled Test                  [code = ]      

 

                Future Scheduled Test                  [code = ]      

 

                Future Scheduled Test                  [code = ]      

 

                Future Scheduled Test                  [code = ]      

 

                Future Scheduled Test                  [code = ]      

 

                Future Scheduled Test                  [code = ]      

 

                Future Scheduled Test                  [code = ]      

 

                Future Scheduled Test                  [code = ]      

 

                Future Scheduled Test                  [code = ]      

 

                Future Scheduled Test                  [code = ]      

 

                Future Scheduled Test                  [code = ]      

 

                Future Scheduled Test                  [code = ]      

 

                Future Scheduled Test                  [code = ]      

 

                Future Scheduled Test                  [code = ]      

 

                Future Scheduled Test                  [code = ]      

 

                Future Scheduled Test                  [code = ]      

 

                Future Scheduled Test                  [code = ]      

 

                Future Scheduled Test                  [code = ]      

 

                Future Scheduled Test                  [code = ]      

 

                Future Scheduled Test                  [code = ]      

 

                Future Scheduled Test                  [code = ]      

 

                Future Scheduled Test                  [code = ]      

 

                Future Scheduled Test                  [code = ]      

 

                Future Scheduled Test                  [code = ]      

 

                Future Scheduled Test                  [code = ]      

 

                Future Scheduled Test                  [code = ]      

 

                Future Scheduled Test                  [code = ]      

 

                Future Scheduled Test                  [code = ]      

 

                Future Scheduled Test                  [code = ]      

 

                Future Scheduled Test                  [code = ]      

 

                Future Scheduled Test                  [code = ]      

 

                Future Appointment 2020 11:40:00 Ken Herman, [address] [place] [address], 



                                                [address]; , [place], [place] 



                                                [id number]      

 

                Future Appointment 2020 13:30:00 Rea [facility], [address] [place] [address] [address]; , [place] [address]   







Social History







                Social Habit    Start Date      Stop Date       Comments

 

                Tobacco smoking status [facility] 2018 00:00:00 2018 00:00

:00 









                    Smoking Status      Start Date          Stop Date

 

                    Never Smoker                            









                          Social History Observation Description

 

                          Birth Sex                 Female







Vital Signs







                Vital Name      Observation Time Observation Value Comments

 

                Height          2020-10-13 00:00:00 64 [in_i]       

 

                BMI (Body Mass Index) 2020-10-13 00:00:00 25.7 kg/m2      

 

                Body Weight     2020-10-13 00:00:00 150 [lb_av]     

 

                BP Diastolic    2020 00:00:00 77 mm[Hg]       

 

                Height          2020 00:00:00 64 [in_i]       

 

                BP Systolic     2020 00:00:00 113 mm[Hg]      

 

                BP Diastolic    2020 00:00:00 65 mm[Hg]       

 

                Height          2020 00:00:00 64 [in_i]       

 

                BP Systolic     2020 00:00:00 126 mm[Hg]      

 

                BP Systolic     2020 00:00:00 134 mm[Hg]      

 

                Body Weight     2020 00:00:00 156 [lb_av]     

 

                BP Diastolic    2020 00:00:00 84 mm[Hg]       

 

                Height          2020 00:00:00 64 [in_i]       

 

                BMI (Body Mass Index) 2020 00:00:00 26.8 kg/m2      

 

                Height          2020 00:00:00 62.9 [in_i]     

 

                BMI (Body Mass Index) 2020 00:00:00 27.1 kg/m2      

 

                BP Systolic     2020 00:00:00 126 mm[Hg]      

 

                Body Weight     2020 00:00:00 152.6 [lb_av]   

 

                BP Diastolic    2020 00:00:00 80 mm[Hg]       

 

                BP Diastolic    2020 00:00:00 76 mm[Hg]       

 

                Height          2020 00:00:00 64 [in_i]       

 

                BMI (Body Mass Index) 2020 00:00:00 24.9 kg/m2      

 

                BP Systolic     2020 00:00:00 118 mm[Hg]      

 

                Body Weight     2020 00:00:00 145 [lb_av]     

 

                Height          2020 00:00:00 64 [in_i]       

 

                BP Diastolic    2020-03-10 00:00:00 74 mm[Hg]       

 

                Height          2020-03-10 00:00:00 64 [in_i]       

 

                BMI (Body Mass Index) 2020-03-10 00:00:00 24.9 kg/m2      

 

                BP Systolic     2020-03-10 00:00:00 120 mm[Hg]      

 

                Body Weight     2020-03-10 00:00:00 145 [lb_av]     

 

                BP Diastolic    2020 00:00:00 78 mm[Hg]       

 

                Height          2020 00:00:00 64 [in_i]       

 

                BMI (Body Mass Index) 2020 00:00:00 24.9 kg/m2      

 

                BP Systolic     2020 00:00:00 128 mm[Hg]      

 

                Body Weight     2020 00:00:00 145 [lb_av]     

 

                BP Diastolic    2020 00:00:00 77 mm[Hg]       

 

                Height          2020 00:00:00 64 [in_i]       

 

                BMI (Body Mass Index) 2020 00:00:00 24.9 kg/m2      

 

                BP Systolic     2020 00:00:00 138 mm[Hg]      

 

                Body Weight     2020 00:00:00 145 [lb_av]     

 

                Height          2020 00:00:00 62.9 [in_i]     

 

                BMI (Body Mass Index) 2020 00:00:00 27.1 kg/m2      

 

                Body Weight     2020 00:00:00 152.5 [lb_av]   

 

                BP Diastolic    2019 00:00:00 81 mm[Hg]       

 

                Height          2019 00:00:00 64 [in_i]       

 

                BP Systolic     2019 00:00:00 129 mm[Hg]      

 

                BP Diastolic    2019 00:00:00 69 mm[Hg]       

 

                Height          2019 00:00:00 64 [in_i]       

 

                BMI (Body Mass Index) 2019 00:00:00 26.8 kg/m2      

 

                BP Systolic     2019 00:00:00 96 mm[Hg]       

 

                Body Weight     2019 00:00:00 156 [lb_av]     

 

                BP Diastolic    2019-10-02 00:00:00 81 mm[Hg]       

 

                Height          2019-10-02 00:00:00 64 [in_i]       

 

                BP Systolic     2019-10-02 00:00:00 154 mm[Hg]      

 

                BP Diastolic    2019 00:00:00 68 mm[Hg]       

 

                Height          2019 00:00:00 62.8 [in_i]     

 

                BMI (Body Mass Index) 2019 00:00:00 26.6 kg/m2      

 

                BP Systolic     2019 00:00:00 100 mm[Hg]      

 

                Body Weight     2019 00:00:00 149 [lb_av]     

 

                BP Diastolic    2019 00:00:00 83 mm[Hg]       

 

                Height          2019 00:00:00 64 [in_i]       

 

                BMI (Body Mass Index) 2019 00:00:00 25.8 kg/m2      

 

                BP Systolic     2019 00:00:00 131 mm[Hg]      

 

                Body Weight     2019 00:00:00 150.5 [lb_av]   

 

                BP Diastolic    2019 00:00:00 60 mm[Hg]       

 

                Height          2019 00:00:00 62.8 [in_i]     

 

                BMI (Body Mass Index) 2019 00:00:00 26.2 kg/m2      

 

                BP Systolic     2019 00:00:00 100 mm[Hg]      

 

                Body Weight     2019 00:00:00 147 [lb_av]     

 

                BP Diastolic    2019 00:00:00 62 mm[Hg]       

 

                Height          2019 00:00:00 64 [in_i]       

 

                BMI (Body Mass Index) 2019 00:00:00 25.2 kg/m2      

 

                BP Systolic     2019 00:00:00 118 mm[Hg]      

 

                Body Weight     2019 00:00:00 147 [lb_av]     

 

                WEIGHT          2018 15:54:00 68 kg           

 

                HEIGHT          2018 15:54:00 160.512657 cm   

 

                WEIGHT          2018 14:39:00 68 kg           

 

                HEIGHT          2018 14:39:00 160.235059 cm   

 

                WEIGHT          2018 09:15:00 68 kg           

 

                HEIGHT          2018 09:15:00 160.612586 cm   

 

                WEIGHT          2017 08:45:00 69.5 kg         

 

                HEIGHT          2017 08:45:00 160.260069 cm   

 

                WEIGHT          2017 00:16:00 69.5 kg         

 

                HEIGHT          2017 00:16:00 160.936897 cm   

 

                SYSTOLIC BLOOD PRESSURE 2018 12:10:00 129 mm[Hg]      

 

                DIASTOLIC BLOOD PRESSURE 2018 12:10:00 64 mm[Hg]       

 

                HEART RATE      2018 12:10:00 64 /min         

 

                HEIGHT          2018 12:10:00 160 cm          

 

                WEIGHT          2018 12:10:00 66.679 kg       

 

                SYSTOLIC BLOOD PRESSURE 2018-10-26 07:22:00 100 mm[Hg]      

 

                DIASTOLIC BLOOD PRESSURE 2018-10-26 07:22:00 54 mm[Hg]       

 

                HEART RATE      2018-10-26 07:22:00 58 /min         

 

                BODY TEMPERATURE 2018-10-26 07:22:00 35.89 Ele       

 

                RESPIRATORY RATE 2018-10-26 07:22:00 15 /min         

 

                OXYGEN SATURATION 2018-10-26 07:22:00 99 %            

 

                HEIGHT          2018-10-25 11:30:00 160 cm          

 

                WEIGHT          2018-10-25 11:30:00 68.1 kg         

 

                SYSTOLIC BLOOD PRESSURE 2018-10-19 09:43:00 123 mm[Hg]      

 

                DIASTOLIC BLOOD PRESSURE 2018-10-19 09:43:00 70 mm[Hg]       

 

                HEART RATE      2018-10-19 09:43:00 63 /min         

 

                HEIGHT          2018-10-19 09:43:00 160 cm          

 

                WEIGHT          2018-10-19 09:43:00 67.586 kg       

 

                SYSTOLIC BLOOD PRESSURE 2018 13:06:00 141 mm[Hg]      

 

                DIASTOLIC BLOOD PRESSURE 2018 13:06:00 88 mm[Hg]       

 

                HEART RATE      2018 13:06:00 62 /min         

 

                WEIGHT          2018 13:06:00 68.947 kg       

 

                SYSTOLIC BLOOD PRESSURE 2018 11:02:00 141 mm[Hg]      

 

                DIASTOLIC BLOOD PRESSURE 2018 11:02:00 88 mm[Hg]       

 

                HEART RATE      2018 11:02:00 62 /min         

 

                HEIGHT          2018 11:02:00 160 cm          

 

                WEIGHT          2018 11:02:00 68.947 kg       

 

                SYSTOLIC BLOOD PRESSURE 2018 10:21:00 117 mm[Hg]      

 

                DIASTOLIC BLOOD PRESSURE 2018 10:21:00 69 mm[Hg]       

 

                HEART RATE      2018 10:21:00 65 /min         

 

                HEIGHT          2018 10:21:00 160 cm          

 

                WEIGHT          2018 10:21:00 69.446 kg       







Hospital Discharge Instructions

1. Low back pain 2. Degeneration of lumbosacral intervertebral disc 3. 
Lumbosacral neuritis Discussion Note: None recorded. Patient educational 
handouts: No information available.1. Low back pain Discussion Note Progress 
Note due on 10th visit Plan of Care Certification Period is 2020 - 
2020. The necessity of therapy services is demonstrated by the exam 
findings and functional test outcome. I certify the need for these services 
furnished under this plan of treatmentfor up to 90 days and while under my care.
 (see Physician signature below) Patient educational handouts: No information 
available.1. Lumbosacral spondylosis without myelopathy 2. Low back pain 3. 
Degeneration of lumbosacral intervertebral disc 4. Long-term drug therapy  
drug screen, urine  drug screen, urine Discussion Note: None recorded. 
Patient educational handouts: No information available.1. Low back pain  XR, 
lumbar spine 2. Lumbar spondylolisthesis Discussion Note Patient has not been 
doing home exercise plan and a form since she finished therapy in . 
Discussed with patient that based on her symptoms I do not see any concern for 
progression from a neurologic standpoint and believe she is still dealing with 
instability at L4-5. Patient continues to note that standing still causes her 
the most back pain. Patient again notes primary complaint is back pain greater 
than the rightlower extremity leg pain that stops in the distal thigh. Patient 
will hold off on MRI at this time and just proceed with the home exercise plan 
following formal therapy. Patient has not seen some relief would consider MRI 
lumbar spine without contrast at that time. Patient educational handouts: No 
information available.1. Impaired glucose tolerance  HbA1c (hemoglobin A1c), 
blood 2. Hypothyroidism 3. Body mass [id number] - overweight  eating healthy 
foods: care instructions 4. Postmenopausal osteoporosis Discussion Note: None 
recorded.May- Continue current medications. Continue to work on diet and 
exercises. Check feet and between toes daily. Wear comfortable, well-fitting 
shoes that do not rub your feet or crowd your toes. Get yearly dilated eye 
exams.Related to Type 2 diabetes mellitus with unspecified complications
May-12-2020continue current measuresRelated to RniygwslgagvgfVrk- Eat 
small meals, and do not eat for 3 hours prior to lying recumbent. Avoid large, 
high fat meals and avoid food that my aggravate the problem.Related to GERD 
without wnnsnfiqpnnOfb- It is important to take your thyroid medication 
on an empty stomach with a full glass ofwater. Do not eat or drink anything 
other than water for at least 30 minutes after taking this medication.Related to
 Hypothyroidism, nmsrrdwrlgdDxu- Goals for cholesterol were discussed. 
Recommend at least 30 minutes of sustained exercise 4 times weekly. Low 
cholesterol diet advised.Related to HyxnykxphyquaiDvq- It is important to
 take your thyroid medication on an empty stomach with a full glass ofwater. Do 
not eat or drink anything other than water for at least 30 minutes after taking 
this medication.Related to Hypothyroidism, ageaynwibnhXnb- Continue 
current measuresRelated to GERD without mcgrupuyycmUkd- Continue current 
medications. Continue to work on diet and exercises. Check feet and between toes
 daily. Wear comfortable, well-fitting shoes that do not rub your feet or crowd 
your toes. Get yearly dilated eye exams.Related to Impaired fasting glucose
 continue current measures CCM: Discussed enrollment in CCM with the 
patient to address impaired fasting glucose and hyperlipidemia. Refer to the 
problem list and interventions for care plan. Will sign patient up with CCM 
team. CCM explained to the patient today. Possible fees were explained to the 
patient as well.Related to GERD without [id number] on 
weight ijchenljeOrk- It is important to take your thyroid medication on 
an empty stomach with a full glass ofwater. Do not eat or drink anything other 
than water for at least 30 minutes after taking this medication.Related to 
Hypothyroidism, tpdnflhpbaoHin- Goals for cholesterol were discussed. 
Recommend at least 30 minutes of sustained exercise 4 times weekly. Low 
cholesterol diet advised.Related to KrsalsefvjxcwnImm- Eat small meals, 
and do not eat for 3 hours prior to lying recumbent. Avoid large, high fat meals
 and avoid food that my aggravate the problem.Related to GERD without 
[id number] EndocrinologyRelated to Impaired fasting glucose
Dec- It is important to take your thyroid medication on an empty stomach 
with a full glass ofwater. Do not eat or drink anything other than water for at 
least 30 minutes after taking this medication.Related to Hypothyroidism, 
lccrsrxhdtqMct- Continue current medications. Continue to work on diet 
and exercises. Check feet and between toes daily. Wear comfortable, well-fitting
 shoes that do not rub your feet or crowd your toes. Get yearly dilated eye 
exams.Related to Impaired fasting egfxgtpSbm- Goals for cholesterol were 
discussed. Recommend at least 30 minutes of sustained exercise 4 times weekly. 
Low cholesterol diet advised.Related to Hyperlipidemia, sejkvimuxvcCkv- 
It is important to take your thyroid medication on an empty stomach with a full 
glass ofwater. Do not eat or drink anything other than water for at least 30 
minutes after taking this medication.Related to Hypothyroidism, unspecified
 It is important to take your thyroid medication on an empty stomach 
with a full glass ofwater. Do not eat or drink anything other than water for at 
least 30 minutes after taking this medication.Related to Hypothyroidism, 
mjbyqsrsvthSzn- Continue current medications. Continue to work on diet 
and exercises. Check feet and between toes daily. Wear comfortable, well-fitting
 shoes that do not rub your feet or crowd your toes. Get yearly dilated eye 
exams.Related to Impaired fasting auosdtvJwb- Goals for cholesterol were 
discussed. Recommend at least 30 minutes of sustained exercise 4 times weekly. 
Low cholesterol diet advised.Related to Hyperlipidemia, lfulwytwfouSqq- 
It is important to exercise for at least 30 minutes 4 days a week. Avoid 
processed foodsand more than 2000 mg. of Sodium in your diet.Related to 
Encounter for adult annual physical exam w/abnormal jdiajyiHdb- Goals for
 cholesterol were discussed. Recommend at least 30 minutes of sustained exercise
 4 times weekly. Low cholesterol diet advised.Related to Hyperlipidemia
Dec- Continue current medications. Continue to work on diet and 
exercises. Check feet and between toes daily. Wear comfortable, well-fitting 
shoes that do not rub your feet or crowd your toes. Get yearly dilated eye 
exams.Related to Impaired fasting wxwdszeKdz- It is important to take 
your thyroid medication on an empty stomach with a full glass ofwater. Do not 
eat or drink anything other than water for at least 30 minutes after taking this
 medication.Related to KttrfozcjodyrsPfq- Take all of the prescribed 
antibiotic. Increase fluid intake. Return to office for persistent or worsening 
symptoms or if you develop fever or chills. Related to [id number] 
management education, guidance, and counselingRelated to Body mass index (BMI) 
26.0-26.9, [id number] follow up with [facility] to make sure 
this is resolved.Related to Abnormal Pap smear of hgeaubImv- I am 
ordering a blood lever to make sure you have had the chicken pox and can get the
 shingles vaccineRelated to Exposure to [id number] am referring you 
to Gynecology for evaluation, return as needed for any concernsRelatedto 
Cervical stump prolapseJun-15-2017You had a adenomatous polyp on your first 
colonoscopy on 2007, no polyps on the lastcolonoscopy in . Due to the 
hx of polyps you need another colonoscopy now.Related to Encounter for screening
 colonoscopyJun-15-2017You will need a repeat Pap smear in DecemberRelated to 
Abnormal Pap smear of cervixJun- Eat small meals, and do not eat for 3 
hours prior to lying recumbent. Avoid large, high fat meals and avoid food that 
my aggravate the problem.Related to GERD without esophagitisJun- Work on 
diet and exercises. Avoid simple carbohydrates. Regular sustained exercise for 
30 minutes 4 days weekly can help stabilize your sugars.Related to Impaired 
fasting glucoseJun- Continue current medications.Related to 
HypothyroidismJun- Goals for cholesterol were discussed. Recommend at 
least 30 minutes of sustained exercise 4 times weekly. Low cholesterol diet 
advised.Related to AhqdlozimciqopUod- Call EMS if you become short of 
breath. Return to office if you become febrile or if symptoms persist/worsen. 
Take NSAIDS or Tylenol per package instructions. Can alternate heat and ice pack
s to painful area as needed.Related to [id number] showed no 
acute ST or T segment changes. She will get stat labs, and will refer to car
diology since this is typical angina. It is likely a musculoskeletal strain 
since it began with physical labor and she is low risk. If enzymes are negative 
she will begin NSAID's. Related to Chest xxvqSoo- Work on diet and 
exercises. Avoid simple carbohydrates. Regular sustained exercise for 30 minutes
 4 days weekly can help stabilize your sugars.Related to Impaired fasting 
qrrtvchPke- Continue current medications.Related to Hypothyroidism
Dec- Goals for cholesterol were discussed. Recommend at least 30 minutes 
of sustained exercise 4 times weekly. Low cholesterol diet advised.Related to 
BdnznjtjsolhcyDar-[id number] management education, guidance, and 
counselingRelated to Body mass index (BMI) 27.0-27.9, [id number] 
foot above the level of your heart. Take Ibuprofen or Aleve OTC as needed. Ice 
packs at 10 minute intervals. Wear the cast walker when weight bearing.Related 
to Metatarsalgia, left dmhvJtk- Eat small meals, and do not eat for 3 
hours prior to lying recumbent. Avoid large, high fat meals and avoid food that 
my aggravate the problem. Weight loss may also help your symptoms. Recommend 
daily intake of 1500mg calcium in divided doses two to three times a day. 
Recommend Vitamin D3 1000 units daily. Regular weight bearing exercises.Related 
to GERD without bjvwqtdkrscVzl- Continue current medications.Related to 
YnbegsmljuoupdYyn-[id number] for cholesterol were discussed. Recommend at 
least 30 minutes of sustained exercise4 times weekly, advise not going 48 hours 
without activity. Low cholesterol diet advised.Related to Hyperlipidemia
 Continue your medication. Work on diet and exercises. Exercise 
decreases insulin resistance and increases insulin sensitivity for 48 hours. 
Avoid simple carbohydrates. Regular sustained exercise for 30 minutes 4 days 
weekly can help stabilize your sugars.Related to Impaired fasting glucose
Dec- Eat small meals, and do not eat for 3 hours prior to lying 
recumbent. Avoid large, high fat meals and avoid food that my aggravate the 
problem.Related to GERD without mkaelpugaoaOhr- Continue current 
medications.Related to TthqkirqcuoreoUnp- Goals for cholesterol were 
discussed. Recommend at least 30 minutes of sustained exercise 4 times weekly. 
Low cholesterol diet provided.Related to HyggvwhdeacfoqQkg- Work on diet 
and exercises. Avoid simple carbohydrates. Regular sustained exercise for 30 
minutes 4 days weekly can help stabilize your sugars.Related to Impaired fasting
 glucoseDec- Recommend exercising at least 30 minutes four days a week. 
Recommend a reduced fat, reduced calorie diet. Goals for weight were 
discussed.Related to Body mass index (BMI) 26.0-26.9, adultDec-10-2015Take 
medications as prescribed. Return to office immediately with any fever, chills, 
worsening of pain or increased drainage.Related to Cellulitis of buttock
Dec-10-2015Take medications as prescribed. Return to office immediately with any
 fever, chills, worsening of pain or increased drainage.Related to Cellulitis of
 groinDec-10-2015Dietary management education, guidance, and counselingRelated 
to Body mass index (BMI) 26.0-26.9, [id number] seems to be 
working. Related to Abscess1. Low back pain  tramadol 50 mg tablet 2. 
Degeneration of lumbosacral intervertebral disc 3. Lumbosacral neuritis 
Discussion Note: None recorded. Patient educational handouts: No information 
available.1. Low back pain 2. Degeneration of lumbosacral intervertebral disc 3.
 Lumbosacral neuritis Discussion Note: None recorded. Patient educational 
handouts: No information available.1. Low back pain 2. Degeneration of 
lumbosacral intervertebral disc 3. Lumbosacral neuritis Discussion Note: None 
recorded. Patient educational handouts: No information available.1. Osteopenia 
 bone density Discussion Note: None recorded. Patient educational handouts: 
No information available.1. Cellulitis  Bactrim  mg-160 mg tablet  
cellulitis: care instructions 2. Varicose veins of lower extremity  varicose 
veins: care instructions Discussion Note: None recorded.1. Varicose veins of 
lower extremity  varicose veins: care instructions Discussion Note: None mohinder
rded.1. Impaired glucose tolerance  HbA1c (hemoglobin A1c), blood 2. 
Hypothyroidism  TSH, serum or plasma 3. Abdominal bloating 4. Chronic 
constipation Discussion Note: None recorded. Patient educational handouts: No 
information available.1. Varicose veins of lower extremity  varicose veins: 
care instructions Discussion Note: None recorded.1. Impaired glucose tolerance 
 patient follow up phone call 2. Hypothyroidism Discussion Note: None 
recorded. Patient educational handouts: No information available.1. Varicose 
veins of lower extremity  compression pantyhose 20-30 mmHg  varicose 
veins: care instructions Discussion Note: None recorded.

## 2020-11-13 VITALS — DIASTOLIC BLOOD PRESSURE: 64 MMHG | SYSTOLIC BLOOD PRESSURE: 117 MMHG

## 2020-11-13 PROCEDURE — 3E02340 INTRODUCTION OF INFLUENZA VACCINE INTO MUSCLE, PERCUTANEOUS APPROACH: ICD-10-PCS | Performed by: ORTHOPAEDIC SURGERY

## 2020-11-13 RX ADMIN — ACETAMINOPHEN SCH MG: 325 TABLET ORAL at 05:38

## 2020-11-13 RX ADMIN — Medication SCH: at 05:39

## 2020-11-13 RX ADMIN — PANTOPRAZOLE SODIUM SCH MG: 20 TABLET, DELAYED RELEASE ORAL at 05:38

## 2020-11-13 RX ADMIN — CETIRIZINE HYDROCHLORIDE SCH MG: 10 TABLET, FILM COATED ORAL at 09:39

## 2020-11-13 RX ADMIN — OXYCODONE HYDROCHLORIDE PRN MG: 5 TABLET ORAL at 02:19

## 2020-11-13 RX ADMIN — LEVOTHYROXINE SODIUM SCH MG: 88 TABLET ORAL at 05:37

## 2020-11-13 RX ADMIN — METHOCARBAMOL SCH MG: 750 TABLET ORAL at 05:38

## 2020-11-13 RX ADMIN — METFORMIN HYDROCHLORIDE SCH: 500 TABLET, FILM COATED ORAL at 09:40

## 2020-11-13 RX ADMIN — GABAPENTIN SCH MG: 300 CAPSULE ORAL at 05:38

## 2020-11-13 RX ADMIN — OXYCODONE HYDROCHLORIDE PRN MG: 5 TABLET ORAL at 08:17

## 2020-11-13 RX ADMIN — POLYETHYLENE GLYCOL 3350 SCH: 17 POWDER, FOR SOLUTION ORAL at 09:39

## 2020-11-13 RX ADMIN — SENNOSIDES, DOCUSATE SODIUM SCH EACH: 50; 8.6 TABLET, FILM COATED ORAL at 09:39

## 2020-11-13 NOTE — PDOC DISCHARGE SUMMARY
General





- Admit/Disc Date/PCP


Admission Date/Primary Care Provider: 


  11/11/20 05:30





  XAVIER BROWN





Discharge Date: 11/13/20





- Discharge Diagnosis


Final Diagnosis: 





Spondylolisthesis L4-5 status post L4-5 robotically assisted anterior lateral 

fusion and posterior fusion instrumentation





- Assessment


Summary: 


Patient is doing well status post L4-5 anterior lateral fusion robotically 

assisted and posterior fusion instrumentation robotically assisted she is 

reporting no significant leg symptoms back pain is tolerable dressings are 

intact patient has 2+ dorsalis pedis pulse no calf tenderness.  Patient will 

follow up in the office in 10 days as per her appointment.





- Additional Information


Resuscitation Status: Full Code


Discharge Diet: As Tolerated


Discharge Activity: No Driving, No Lifting/Push/Pulling, No tub bath


Referrals: 


NGUYEN PARNELL FNP-C [Primary Care Provider] - 


(11/12/20 0916 PROVIDERS OFFICE DID NOT ANSWER SO I LEFT A MESSAGE AND THEY WILL

CONTACT PATIENT WITH FOLLOW UP APT


)


Home Medications: 








Atorvastatin Calcium [Lipitor 40 mg Tablet] 40 mg PO DAILY 11/06/20 


Cetirizine HCl [Zyrtec 10 mg Tablet] 10 mg PO DAILY 11/06/20 


Gabapentin 300 mg PO QID 11/06/20 


Levothyroxine Sodium [Synthroid 0.088 mg Tablet] 88 mcg PO Q6AM 11/06/20 


Omeprazole 20 mg PO Q6AM 11/06/20 


Metformin HCl [Metformin HCl ER] 500 mg PO DAILY 11/11/20 


Acetaminophen [Tylenol 325 mg Tablet] 975 mg PO Q8  tablet 11/12/20 


Bisacodyl [Dulcolax 5 mg Tablet] 10 mg PO .POST-OP DAY#2 PRN  tabec 11/12/20 


Methocarbamol [Robaxin 750 mg Tablet] 1,500 mg PO Q8  tablet 11/12/20 


Ondansetron HCl/Pf [Zofran Inj/Pf 4 mg/2 ml Sdv] 4 mg IV Q6HP PRN  vial 11/12/20




Oxycodone HCl [Oxy-Ir 5 mg Tablet] 5 mg PO Q4HP PRN  tablet 11/12/20 











History of Present Illiness


History of Present Illness: 


AMANDA CHAMORRO is a 65 year old female








Physical Exam


Vital Signs: 


                                        











Temp Pulse Resp BP Pulse Ox


 


 98.0 F   82   18   100/52 L  93 


 


 11/13/20 07:49  11/13/20 07:49  11/13/20 07:49  11/13/20 07:49  11/13/20 07:49








                                 Intake & Output











 11/12/20 11/13/20 11/14/20





 06:59 06:59 06:59


 


Intake Total 2560 2860 


 


Output Total 20 1 


 


Balance 2540 2859 


 


Weight 66 kg  














Results


Laboratory Results: 


                                        











WBC  4.5 10^3/uL (4.0-10.5)   11/06/20  10:55    


 


RBC  4.17 10^6/uL (3.72-5.28)   11/06/20  10:55    


 


Hgb  12.9 g/dL (12.0-15.5)   11/06/20  10:55    


 


Hct  37.6 % (36.0-47.0)   11/06/20  10:55    


 


MCV  90 fl (80-97)   11/06/20  10:55    


 


MCH  30.9 pg (27.0-33.4)   11/06/20  10:55    


 


MCHC  34.3 g/dL (32.0-36.0)   11/06/20  10:55    


 


RDW  14.1 % (11.5-14.0)  H  11/06/20  10:55    


 


Plt Count  236 10^3/uL (150-450)   11/06/20  10:55    


 


Sodium  137.2 mmol/L (137-145)   11/06/20  10:55    


 


Potassium  4.5 mmol/L (3.6-5.0)   11/06/20  10:55    


 


Chloride  104 mmol/L ()   11/06/20  10:55    


 


Carbon Dioxide  27 mmol/L (22-30)   11/06/20  10:55    


 


Anion Gap  6  (5-19)   11/06/20  10:55    


 


BUN  19 mg/dL (7-20)   11/06/20  10:55    


 


Creatinine  0.84 mg/dL (0.52-1.25)   11/06/20  10:55    


 


Est GFR ( Amer)  > 60  (>60)   11/06/20  10:55    


 


Est GFR (MDRD) Non-Af  > 60  (>60)   11/06/20  10:55    


 


Glucose  87 mg/dL ()   11/06/20  10:55    


 


POC Glucose  92 mg/dL ()   11/11/20  07:06    


 


Hemoglobin A1c %  5.8 % (4.7-6.0)   11/06/20  10:55    


 


Calcium  9.4 mg/dL (8.4-10.2)   11/06/20  10:55    


 


Urine Color  YELLOW   11/06/20  10:45    


 


Urine Appearance  CLEAR   11/06/20  10:45    


 


Urine pH  5.0  (5.0-9.0)   11/06/20  10:45    


 


Ur Specific Gravity  1.013   11/06/20  10:45    


 


Urine Protein  NEGATIVE mg/dL (NEGATIVE)   11/06/20  10:45    


 


Urine Glucose (UA)  NEGATIVE mg/dL (NEGATIVE)   11/06/20  10:45    


 


Urine Ketones  NEGATIVE mg/dL (NEGATIVE)   11/06/20  10:45    


 


Urine Blood  NEGATIVE  (NEGATIVE)   11/06/20  10:45    


 


Urine Nitrite  NEGATIVE  (NEGATIVE)   11/06/20  10:45    


 


Urine Bilirubin  NEGATIVE  (NEGATIVE)   11/06/20  10:45    


 


Urine Urobilinogen  NEGATIVE mg/dL (<2.0)   11/06/20  10:45    


 


Ur Leukocyte Esterase  TRACE  (NEGATIVE)  H  11/06/20  10:45    


 


Urine WBC (Auto)  1 /HPF  11/06/20  10:45    


 


Urine RBC (Auto)  0 /HPF  11/06/20  10:45    


 


U Hyaline Cast (Auto)  1 /LPF  11/06/20  10:45    


 


Squamous Epi Cells Auto  <1 /HPF  11/06/20  10:45    


 


Urine Mucus (Auto)  RARE /LPF  11/06/20  10:45    


 


Urine Ascorbic Acid  NEGATIVE  (NEGATIVE)   11/06/20  10:45    


 


Urine Opiates Screen  NEGATIVE   11/06/20  10:45    


 


Urine Methadone Screen  NEGATIVE   11/06/20  10:45    


 


Ur Barbiturates Screen  NEGATIVE   11/06/20  10:45    


 


Ur Phencyclidine Scrn  NEGATIVE   11/06/20  10:45    


 


Ur Amphetamines Screen  NEGATIVE   11/06/20  10:45    


 


U Benzodiazepines Scrn  NEGATIVE   11/06/20  10:45    


 


Urine Cocaine Screen  NEGATIVE   11/06/20  10:45    


 


U Marijuana (THC) Screen  NEGATIVE   11/06/20  10:45    


 


Serum Alcohol  < 10 mg/dL (NONE DETECTED)   11/06/20  10:55    


 


COVID-19 Source  See comment   11/06/20  10:45    


 


COVID-19 ([facility])  Not Detected  (Not Detect)   11/06/20  10:45    


 


Blood Type  O NEGATIVE   11/11/20  06:34    


 


Antibody Screen  NEGATIVE   11/11/20  06:34    











Impressions: 


                                        





Chest X-Ray  11/06/20 00:00


IMPRESSION:  NO SIGNIFICANT RADIOGRAPHIC FINDING IN THE CHEST.


 








Fluoroscopy  11/11/20 00:00


IMPRESSION:  IMAGE(S) OBTAINED DURING PROCEDURE.


 








Lumbar Spine X-Ray  11/11/20 00:00


IMPRESSION:  IMAGE(S) OBTAINED DURING PROCEDURE.